# Patient Record
Sex: MALE | Race: WHITE | Employment: OTHER | URBAN - METROPOLITAN AREA
[De-identification: names, ages, dates, MRNs, and addresses within clinical notes are randomized per-mention and may not be internally consistent; named-entity substitution may affect disease eponyms.]

---

## 2017-04-04 RX ORDER — TAMSULOSIN HYDROCHLORIDE 0.4 MG/1
CAPSULE ORAL
Qty: 90 CAP | Refills: 1 | Status: SHIPPED | OUTPATIENT
Start: 2017-04-04 | End: 2017-10-25 | Stop reason: SDUPTHER

## 2017-04-27 RX ORDER — AMLODIPINE BESYLATE 10 MG/1
TABLET ORAL
Qty: 30 TAB | Refills: 6 | Status: SHIPPED | OUTPATIENT
Start: 2017-04-27 | End: 2017-09-25 | Stop reason: SDUPTHER

## 2017-08-28 ENCOUNTER — HOSPITAL ENCOUNTER (OUTPATIENT)
Dept: NON INVASIVE DIAGNOSTICS | Age: 72
Discharge: HOME | End: 2017-08-28
Attending: INTERNAL MEDICINE
Payer: MEDICARE

## 2017-08-28 DIAGNOSIS — I35.9 AORTIC VALVE DISORDER: ICD-10-CM

## 2017-08-28 DIAGNOSIS — I10 ESSENTIAL HYPERTENSION: ICD-10-CM

## 2017-08-28 PROCEDURE — 93306 TTE W/DOPPLER COMPLETE: CPT

## 2017-08-29 NOTE — PROGRESS NOTES
Per your last note last year \"Aortic valve disorder. The patient has a sclerotic aortic valve with minimal stenosis and mild aortic insufficiency last assessed by echocardiogram in 2014. I would like to reassess this with an echocardiogram in 1 year prior to his next visit. No new symptoms concerning for high-grade disease.

## 2017-09-15 ENCOUNTER — TELEPHONE (OUTPATIENT)
Dept: CARDIOLOGY CLINIC | Age: 72
End: 2017-09-15

## 2017-09-15 NOTE — TELEPHONE ENCOUNTER
----- Message from Huma Pascual MD sent at 9/8/2017  8:25 AM EDT -----  Please let the patient know that his echocardiogram was unchanged compared to the one from 2014.  ----- Message -----     From: Jan Suazo RN     Sent: 8/29/2017   7:30 AM       To: Huma Pascual MD    Per your last note last year \"Aortic valve disorder. The patient has a sclerotic aortic valve with minimal stenosis and mild aortic insufficiency last assessed by echocardiogram in 2014. I would like to reassess this with an echocardiogram in 1 year prior to his next visit. No new symptoms concerning for high-grade disease.

## 2017-09-25 RX ORDER — AMLODIPINE BESYLATE 10 MG/1
TABLET ORAL
Qty: 90 TAB | Refills: 3 | Status: SHIPPED | OUTPATIENT
Start: 2017-09-25

## 2017-10-25 ENCOUNTER — OFFICE VISIT (OUTPATIENT)
Dept: CARDIOLOGY CLINIC | Age: 72
End: 2017-10-25

## 2017-10-25 VITALS
WEIGHT: 165 LBS | OXYGEN SATURATION: 98 % | DIASTOLIC BLOOD PRESSURE: 92 MMHG | HEIGHT: 68 IN | HEART RATE: 54 BPM | BODY MASS INDEX: 25.01 KG/M2 | SYSTOLIC BLOOD PRESSURE: 180 MMHG

## 2017-10-25 DIAGNOSIS — E11.9 CONTROLLED TYPE 2 DIABETES MELLITUS WITHOUT COMPLICATION, WITHOUT LONG-TERM CURRENT USE OF INSULIN (HCC): ICD-10-CM

## 2017-10-25 DIAGNOSIS — R01.1 HEART MURMUR: ICD-10-CM

## 2017-10-25 DIAGNOSIS — Z72.0 TOBACCO ABUSE: ICD-10-CM

## 2017-10-25 DIAGNOSIS — I10 ESSENTIAL HYPERTENSION: Primary | ICD-10-CM

## 2017-10-25 DIAGNOSIS — E78.5 DYSLIPIDEMIA: ICD-10-CM

## 2017-10-25 RX ORDER — ROSUVASTATIN CALCIUM 20 MG/1
20 TABLET, COATED ORAL
COMMUNITY

## 2017-10-25 RX ORDER — BENAZEPRIL HYDROCHLORIDE 20 MG/1
20 TABLET ORAL DAILY
Qty: 30 TAB | Refills: 5 | Status: SHIPPED | OUTPATIENT
Start: 2017-10-25 | End: 2017-11-15 | Stop reason: SDUPTHER

## 2017-10-25 NOTE — PATIENT INSTRUCTIONS
Follow up in 6 months   Metabolic Panel Lab (bloodwork) in 4 weeks   Start benazepril 20 mg once a day(sent to [facility])   nessa call in 3-4 weeks to report his Blood pressure (keep a journal) (Check first thing in the morning and about 2-3 hours after you take your Blood pressure medication)         Home Blood Pressure Test: About This Test  What is it? A home blood pressure test allows you to keep track of your blood pressure at home. Blood pressure is a measure of the force of blood against the walls of your arteries. Blood pressure readings include two numbers, such as 130/80 (say \"130 over 80\"). The first number is the systolic pressure. The second number is the diastolic pressure. Why is this test done? You may do this test at home to:  · Find out if you have high blood pressure. · Track your blood pressure if you have high blood pressure. · Track how well medicine is working to reduce high blood pressure. · Check how lifestyle changes, such as weight loss and exercise, are affecting blood pressure. How can you prepare for the test?  · Do not use caffeine, tobacco, or medicines known to raise blood pressure (such as nasal decongestant sprays) for at least 30 minutes before taking your blood pressure. · Do not exercise for at least 30 minutes before taking your blood pressure. What happens before the test?  Take your blood pressure while you feel comfortable and relaxed. Sit quietly with both feet on the floor for at least 5 minutes before the test.  What happens during the test?  · Sit with your arm slightly bent and resting on a table so that your upper arm is at the same level as your heart. · Roll up your sleeve or take off your shirt to expose your upper arm. · Wrap the blood pressure cuff around your upper arm so that the lower edge of the cuff is about 1 inch above the bend of your elbow.   Proceed with the following steps depending on if you are using an automatic or manual pressure monitor. Automatic blood pressure monitors  · Press the on/off button on the automatic monitor and wait until the ready-to-measure \"heart\" symbol appears next to zero in the display window. · Press the start button. The cuff will inflate and deflate by itself. · Your blood pressure numbers will appear on the screen. · Write your numbers in your log book, along with the date and time. Manual blood pressure monitors  · Place the earpieces of a stethoscope in your ears, and place the bell of the stethoscope over the artery, just below the cuff. · Close the valve on the rubber inflating bulb. · Squeeze the bulb rapidly with your opposite hand to inflate the cuff until the dial or column of mercury reads about 30 mm Hg higher than your usual systolic pressure. If you do not know your usual pressure, inflate the cuff to 210 mm Hg or until the pulse at your wrist disappears. · Open the pressure valve just slightly by twisting or pressing the valve on the bulb. · As you watch the pressure slowly fall, note the level on the dial at which you first start to hear a pulsing or tapping sound through the stethoscope. This is your systolic blood pressure. · Continue letting the air out slowly. The sounds will become muffled and will finally disappear. Note the pressure when the sounds completely disappear. This is your diastolic blood pressure. Let out all the remaining air. · Write your numbers in your log book, along with the date and time. What else should you know about the test?  Results for adults ages 25 and older (mm Hg):  · Normal (ideal): Systolic 893 or below. Diastolic 79 or below. · Prehypertension: Systolic 329 to 877. Diastolic 80 to 89. · Hypertension: Systolic 387 or above. Diastolic 90 or above. Follow-up care is a key part of your treatment and safety. Be sure to make and go to all appointments, and call your doctor if you are having problems.  It's also a good idea to keep a list of the medicines you take. Where can you learn more? Go to http://spike-rosa isela.info/. Enter C427 in the search box to learn more about \"Home Blood Pressure Test: About This Test.\"  Current as of: November 3, 2016  Content Version: 11.3  © 1835-3814 [employer]. Care instructions adapted under license by [employer] (which disclaims liability or warranty for this information). If you have questions about a medical condition or this instruction, always ask your healthcare professional. [address] any warranty or liability for your use of this information.

## 2017-10-25 NOTE — PROGRESS NOTES
1. Have you been to the ER, urgent care clinic since your last visit? Hospitalized since your last visit? No     2. Have you seen or consulted any other health care providers outside of the [address] [place] [address] since your last visit? Include any pap smears or colon screening.  No

## 2017-10-25 NOTE — MR AVS SNAPSHOT
Visit Information Date & Time Provider Department Dept. Phone Encounter #  
 10/25/2017  8:20 AM Renay Thayer MD [facility] [phone] Upcoming Health Maintenance Date Due Hepatitis C Screening 1945 HEMOGLOBIN A1C Q6M 1945 FOOT EXAM Q1 5/22/1956 MICROALBUMIN Q1 5/22/1956 EYE EXAM RETINAL OR DILATED Q1 5/22/1956 DTaP/Tdap/Td series (1 - Tdap) 5/22/1967 FOBT Q 1 YEAR AGE 50-75 5/22/1996 ZOSTER VACCINE AGE 60> 3/22/2006 GLAUCOMA SCREENING Q2Y 5/22/2011 Pneumococcal 65+ Low/Medium Risk (1 of 2 - PCV13) 5/22/2011 MEDICARE YEARLY EXAM 5/22/2011 LIPID PANEL Q1 5/27/2015 INFLUENZA AGE 9 TO ADULT 8/1/2017 Allergies as of 10/25/2017  Review Complete On: 9/13/2016 By: Renay Thayer MD  
 No Known Allergies Current Immunizations  Never Reviewed No immunizations on file. Not reviewed this visit You Were Diagnosed With   
  
 Codes Comments Heart murmur    -  Primary ICD-10-CM: R01.1 ICD-9-CM: 733. 2 Dyslipidemia     ICD-10-CM: E78.5 ICD-9-CM: 272.4 Essential hypertension     ICD-10-CM: I10 
ICD-9-CM: 401.9 Vitals BP Pulse Height(growth percentile) Weight(growth percentile) SpO2 BMI  
 (!) 180/92 (!) 54 5' 8\" (1.727 m) 165 lb (74.8 kg) 98% 25.09 kg/m2 Smoking Status Current Every Day Smoker Vitals History BMI and BSA Data Body Mass Index Body Surface Area 25.09 kg/m 2 1.89 m 2 Preferred Pharmacy Pharmacy Name Phone [phone], [address] [phone] Your Updated Medication List  
  
   
This list is accurate as of: 10/25/17  9:23 AM.  Always use your most recent med list. amLODIPine 10 mg tablet Commonly known as:  Haris Jerome TAKE 1 TABLET BY MOUTH DAILY. aspirin 81 mg tablet Take 81 mg by mouth daily. atenolol 50 mg tablet Commonly known as:  TENORMIN Take 50 mg by mouth daily. CRESTOR 20 mg tablet Generic drug:  rosuvastatin Take 20 mg by mouth nightly. PriLOSEC 10 mg capsule Generic drug:  omeprazole Take 5 mg by mouth daily. tamsulosin 0.4 mg capsule Commonly known as:  FLOMAX TAKE ONE CAPSULE BY MOUTH DAILY TRILIPIX 135 mg capsule Generic drug:  fenofibric acid Take 135 mg by mouth nightly. VITAMIN A PO Take  by mouth daily. VITAMIN D3 1,000 unit Cap Generic drug:  cholecalciferol Take  by mouth daily. We Performed the Following AMB POC EKG ROUTINE W/ 12 LEADS, INTER & REP [18870 CPT(R)] To-Do List   
 11/15/2017 Lab:  METABOLIC PANEL, BASIC Patient Instructions Follow up in 6 months Metabolic Panel Lab (bloodwork) in 4 weeks Start benazepril 20 mg once a day(sent to [facility]) pleasce call in 3-4 weeks to report his Blood pressure (keep a journal) Home Blood Pressure Test: About This Test 
What is it? A home blood pressure test allows you to keep track of your blood pressure at home. Blood pressure is a measure of the force of blood against the walls of your arteries. Blood pressure readings include two numbers, such as 130/80 (say \"130 over 80\"). The first number is the systolic pressure. The second number is the diastolic pressure. Why is this test done? You may do this test at home to: · Find out if you have high blood pressure. · Track your blood pressure if you have high blood pressure. · Track how well medicine is working to reduce high blood pressure. · Check how lifestyle changes, such as weight loss and exercise, are affecting blood pressure. How can you prepare for the test? 
· Do not use caffeine, tobacco, or medicines known to raise blood pressure (such as nasal decongestant sprays) for at least 30 minutes before taking your blood pressure. · Do not exercise for at least 30 minutes before taking your blood pressure. What happens before the test? 
Take your blood pressure while you feel comfortable and relaxed. Sit quietly with both feet on the floor for at least 5 minutes before the test. 
What happens during the test? 
· Sit with your arm slightly bent and resting on a table so that your upper arm is at the same level as your heart. · Roll up your sleeve or take off your shirt to expose your upper arm. · Wrap the blood pressure cuff around your upper arm so that the lower edge of the cuff is about 1 inch above the bend of your elbow. Proceed with the following steps depending on if you are using an automatic or manual pressure monitor. Automatic blood pressure monitors · Press the on/off button on the automatic monitor and wait until the ready-to-measure \"heart\" symbol appears next to zero in the display window. · Press the start button. The cuff will inflate and deflate by itself. · Your blood pressure numbers will appear on the screen. · Write your numbers in your log book, along with the date and time. Manual blood pressure monitors · Place the earpieces of a stethoscope in your ears, and place the bell of the stethoscope over the artery, just below the cuff. · Close the valve on the rubber inflating bulb. · Squeeze the bulb rapidly with your opposite hand to inflate the cuff until the dial or column of mercury reads about 30 mm Hg higher than your usual systolic pressure. If you do not know your usual pressure, inflate the cuff to 210 mm Hg or until the pulse at your wrist disappears. · Open the pressure valve just slightly by twisting or pressing the valve on the bulb. · As you watch the pressure slowly fall, note the level on the dial at which you first start to hear a pulsing or tapping sound through the stethoscope. This is your systolic blood pressure. · Continue letting the air out slowly.  The sounds will become muffled and will finally disappear. Note the pressure when the sounds completely disappear. This is your diastolic blood pressure. Let out all the remaining air. · Write your numbers in your log book, along with the date and time. What else should you know about the test? 
Results for adults ages 25 and older (mm Hg): · Normal (ideal): Systolic 162 or below. Diastolic 79 or below. · Prehypertension: Systolic 142 to 466. Diastolic 80 to 89. · Hypertension: Systolic 399 or above. Diastolic 90 or above. Follow-up care is a key part of your treatment and safety. Be sure to make and go to all appointments, and call your doctor if you are having problems. It's also a good idea to keep a list of the medicines you take. Where can you learn more? Go to http://spike-rosa isela.info/. Enter C427 in the search box to learn more about \"Home Blood Pressure Test: About This Test.\" Current as of: November 3, 2016 Content Version: 11.3 © 2903-5389 [email or web address]. Care instructions adapted under license by [employer] (which disclaims liability or warranty for this information). If you have questions about a medical condition or this instruction, always ask your healthcare professional. [address] any warranty or liability for your use of this information. Introducing [facility] HE[facility]! Dear Errol Payment: Thank you for requesting a "[employer]" account. Our records indicate that you already have an active "[employer]" account. You can access your account anytime at [email or web address] Did you know that you can access your hospital and ER discharge instructions at any time in "[employer]"? You can also review all of your test results from your hospital stay or ER visit. Additional Information If you have questions, please visit the Frequently Asked Questions section of the "[employer]" website at [email or web address] Remember, [employer] is NOT to be used for urgent needs. For medical emergencies, dial 911. Now available from your iPhone and Android! Please provide this summary of care documentation to your next provider. Your primary care clinician is listed as Neal Hannon. If you have any questions after today's visit, please call [phone].

## 2017-10-25 NOTE — PROGRESS NOTES
HISTORY OF PRESENT ILLNESS  Mitzi Bonilla is a 71 y.o. male. Hypertension   Pertinent negatives include no chest pain, no abdominal pain, no headaches and no shortness of breath. Dizziness   Pertinent negatives include no chest pain, no abdominal pain, no headaches and no shortness of breath. Patient presents for a followup office visit. He was originally referred here for an abnormal echocardiogram as part of a preoperative workup. The patient does have a history of hypertension and dyslipidemia, along with long-standing tobacco history. He does not have any known cardiac disease. Patient underwent a pharmacological nuclear stress test April 2009, which was a normal study. He also underwent an echocardiogram in September 2010, which showed normal left ventricular ejection fraction 60-65%, mild LVH, aortic valve sclerosis with only minimal stenosis and mild aortic insufficiency. More recently, he underwent a follow-up echocardiogram in August 2017 which showed normal left ventricular ejection fraction 60-65%, with aortic valve sclerosis, no stenosis, and mild aortic insufficiency. Patient was last seen in the office 1 year ago. Since last visit, he stopped taking his Benicar 2 months ago after reading about some side effects. He states he was having some abdominal discomfort, which resolved after stopping the medication, however, he has noted that his blood pressure has been more elevated since being off the medication. He does complain of occasional dizzy spells but no azam syncope. No palpitations, no chest pain, shortness of breath, orthopnea or PND. No leg swelling or claudication. Past Medical History:   Diagnosis Date    Arthritis     hip and back    Cardiac echocardiogram 06/24/2014    EF 65%. No WMA. Normal fdiastolic fx. Mild AI. No significant chg from study of 9/10/10.  Cardiac nuclear imaging study, normal 04/03/2009    No ischemia or prior infarction. EF 68%.   No reg'l WMA.  [facility]2012    type II    Dyslipidemia     Essential hypertension     Gallstones     GERD (gastroesophageal reflux disease)     Heart murmur     HTN (hypertension)     Irregular heart beat     Kidney disease     Osteopenia       Current Outpatient Prescriptions   Medication Sig Dispense Refill    rosuvastatin (CRESTOR) 20 mg tablet Take 20 mg by mouth nightly.  amLODIPine (NORVASC) 10 mg tablet TAKE 1 TABLET BY MOUTH DAILY. 90 Tab 3    tamsulosin (FLOMAX) 0.4 mg capsule TAKE ONE CAPSULE BY MOUTH DAILY 90 Cap 2    omeprazole (PRILOSEC) 10 mg capsule Take 5 mg by mouth daily.  Cholecalciferol, Vitamin D3, (VITAMIN D3) 1,000 unit cap Take  by mouth daily.  VITAMIN A PO Take  by mouth daily.  fenofibric acid (TRILIPIX) 135 mg capsule Take 135 mg by mouth nightly.  atenolol (TENORMIN) 50 mg tablet Take 50 mg by mouth daily.  aspirin 81 mg tablet Take 81 mg by mouth daily. No Known Allergies       Social History   Substance Use Topics    Smoking status: Current Every Day Smoker     Packs/day: 0.25     Years: 50.00     Last attempt to quit: 10/1/2012    Smokeless tobacco: Never Used    Alcohol use No      Comment: occasionally          Review of Systems   Constitutional: Negative for chills, fever and weight loss. HENT: Negative for nosebleeds. Eyes: Negative for blurred vision and double vision. Respiratory: Negative for cough, shortness of breath and wheezing. Cardiovascular: Negative for chest pain, palpitations, orthopnea, claudication, leg swelling and PND. Gastrointestinal: Negative for abdominal pain, heartburn, nausea and vomiting. Genitourinary: Negative for dysuria and hematuria. Musculoskeletal: Negative for falls and myalgias. Skin: Negative for rash. Neurological: Positive for dizziness. Negative for focal weakness and headaches. Endo/Heme/Allergies: Does not bruise/bleed easily.    Psychiatric/Behavioral: Negative for substance abuse. Visit Vitals    BP (!) 180/92    Pulse (!) 54    Ht 5' 8\" (1.727 m)    Wt 74.8 kg (165 lb)    SpO2 98%    BMI 25.09 kg/m2      Physical Exam   Constitutional: He is oriented to person, place, and time. He appears well-developed and well-nourished. HENT:   Head: Normocephalic and atraumatic. Eyes: Conjunctivae are normal.   Neck: Neck supple. No JVD present. Carotid bruit is not present. Cardiovascular: Regular rhythm, S1 normal, S2 normal and normal pulses. Bradycardia present. Exam reveals no gallop and no S3.    Murmur heard. Midsystolic murmur is present with a grade of 2/6  at the upper right sternal border radiating to the neck  Pulmonary/Chest: Breath sounds normal. He has no wheezes. He has no rales. Abdominal: Soft. Bowel sounds are normal. There is no tenderness. Musculoskeletal: He exhibits no edema. Neurological: He is alert and oriented to person, place, and time. Skin: Skin is warm and dry. EKG: Sinus bradycardia, occasional PAC, borderline poor R. Progression,  Normal axis, normal QTc interval.  Nonspecific ST abnormality. Compared to previous EKG, the nonspecific ST abnormality is slightly worse. ASSESSMENT and PLAN    Essential hypertension. Patient's blood pressure significantly elevated today in the office. This is likely due to him stopping the Benicar 2 months ago. I recommended starting a different class of medications, I would like to try him on an ACE inhibitor, benazepril 20 mg daily and checking a Chem-7 panel in 1 month. Aortic valve disorder. This is only mild in severity and was recently rechecked on an echocardiogram in August 2017. He has aortic valve sclerosis with mild aortic insufficiency but no significant stenosis. This is not significantly changed over the past 6-7 years, so I do not think a follow-up echocardiogram will be needed unless new symptoms arise. Dyslipidemia: Mixed.   Patient has been managed with both Crestor and fenofibric acid, both of which I would continue. This followed closely by his PCP. Diabetes mellitus. e was initially started on oral agents, but is now managing this with lifestyle modification. His goal A1c should be less than 7. History of Tobacco abuse. Patient has a 25-30-pack-year history, he quit smoking briefly several years ago and has been smoking half a pack of cigarettes a day again for the past 3 years. He was strongly encouraged to consider smoking cessation.     Followup in 6 months, sooner if needed

## 2017-11-15 RX ORDER — BENAZEPRIL HYDROCHLORIDE 20 MG/1
20 TABLET ORAL DAILY
Qty: 90 TAB | Refills: 3 | Status: SHIPPED | OUTPATIENT
Start: 2017-11-15 | End: 2017-12-04 | Stop reason: ALTCHOICE

## 2017-12-01 LAB
AMBIG ABBREV BMP8 DEFAULT, 977205: NORMAL
BUN SERPL-MCNC: 29 MG/DL (ref 8–27)
BUN/CREAT SERPL: 18 (ref 10–24)
CALCIUM SERPL-MCNC: 9.8 MG/DL (ref 8.6–10.2)
CHLORIDE SERPL-SCNC: 105 MMOL/L (ref 96–106)
CO2 SERPL-SCNC: 24 MMOL/L (ref 18–29)
CREAT SERPL-MCNC: 1.65 MG/DL (ref 0.76–1.27)
GFR SERPLBLD CREATININE-BSD FMLA CKD-EPI: 41 ML/MIN/1.73
GFR SERPLBLD CREATININE-BSD FMLA CKD-EPI: 48 ML/MIN/1.73
GLUCOSE SERPL-MCNC: 101 MG/DL (ref 65–99)
POTASSIUM SERPL-SCNC: 5.5 MMOL/L (ref 3.5–5.2)
SODIUM SERPL-SCNC: 142 MMOL/L (ref 134–144)

## 2017-12-01 NOTE — PROGRESS NOTES
Essential hypertension. Patient's blood pressure significantly elevated today in the office. This is likely due to him stopping the Benicar 2 months ago.   I recommended starting a different class of medications, I would like to try him on an ACE inhibitor, benazepril 20 mg daily and checking a Chem-7 panel in 1 month.

## 2017-12-04 ENCOUNTER — TELEPHONE (OUTPATIENT)
Dept: CARDIOLOGY CLINIC | Age: 72
End: 2017-12-04

## 2017-12-04 DIAGNOSIS — N28.9 WORSENING RENAL FUNCTION: Primary | ICD-10-CM

## 2017-12-04 RX ORDER — HYDRALAZINE HYDROCHLORIDE 50 MG/1
50 TABLET, FILM COATED ORAL 2 TIMES DAILY
Qty: 180 TAB | Refills: 3 | Status: SHIPPED | OUTPATIENT
Start: 2017-12-04 | End: 2018-11-19 | Stop reason: SDUPTHER

## 2017-12-04 NOTE — TELEPHONE ENCOUNTER
Called patient. Verified  and full name. Informed about results per Dr Alex Wang. Patient scheduled for 10 January. Refill started for hydralazine. Patient will stop benazepril today. Patient verbalized understanding and agreed with the plan of care. He will be waiting for us to call him to schedule US.

## 2017-12-04 NOTE — TELEPHONE ENCOUNTER
----- Message from Kristian Gracia MD sent at 12/4/2017  1:05 PM EST -----  Regarding: Abnormal labs  Patient's renal function worsened on the ACE inhibitor and his potassium is now elevated. This is likely due to the medication. He will need to stop his benazepril, and I would like to order a renal arterial duplex scan to evaluate for any renal artery stenosis. Finally I would like to start hydralazine 50 mg twice daily. I would then like to see him back in the office in 1 month.  ----- Message -----     From: Zuleima Gomez     Sent: 12/1/2017  12:37 PM       To: Kristian Gracia MD    Essential hypertension. Patient's blood pressure significantly elevated today in the office. This is likely due to him stopping the Benicar 2 months ago.   I recommended starting a different class of medications, I would like to try him on an ACE inhibitor, benazepril 20 mg daily and checking a Chem-7 panel in 1 month.

## 2017-12-18 ENCOUNTER — HOSPITAL ENCOUNTER (OUTPATIENT)
Dept: VASCULAR SURGERY | Age: 72
Discharge: HOME | End: 2017-12-18
Attending: INTERNAL MEDICINE
Payer: MEDICARE

## 2017-12-18 DIAGNOSIS — N28.9 WORSENING RENAL FUNCTION: ICD-10-CM

## 2017-12-18 PROCEDURE — 93975 VASCULAR STUDY: CPT

## 2017-12-18 NOTE — PROCEDURES
[facility]  *** FINAL REPORT ***    Name: Rodrigo Dunn  MRN: [id number]    Outpatient  : 22 May 1945  HIS Order #: [id number]  [id number] [facility] Visit #: [id number]  Date: 18 Dec 2017    TYPE OF TEST: Visceral Arterial Duplex    REASON FOR TEST    Aortic PSV: 272.0 cm/s  Diameter AP: 1.6 cm   TV: 1.6 cm                   Right          Left  Renal Artery:- -------------  -------------  Proximal  PSV: 637.0          341.0  Mid       PSV: 158.0          159.0  Distal    PSV: 148.0          135.0  Aortic ratio :   2.3            1.3    Medullary PSV:  35.0           28.0            EDV:  12.0           10.0            EDR:   0.3            0.4            SDR:   2.9            2.8    Cortical  PSV:  24.0           21.0            EDV:  11.0            8.0            EDR:   0.5            0.4            SDR:   2.2            2.6  Stenosis:      Severe > 60%   Severe > 60%  Kidney size:   12.4 cm        12.9 cm               x  6.0 cm      x  5.8 cm    Hilar:-        Right          Left  Acc. Time  AT:   5 secs        10 secs  Acc. Index AI:             RI: 0.85           0.85    INTERPRETATION/FINDINGS  Duplex images were obtained using 2-D gray scale, color flow, and  spectral Doppler analysis. 1. Note: High aortic PSV invalidates the use of RAR as an indicator of   renal stenosis. RENAL:  1. Bilateral Severe > 60% renal artery stenosis. 2. The right kidney measures 12.4 cm. 3. The left kidney measures 12.9 cm.  4. The renal vein is patent bilaterally. 5.  There is no evidence of aneurysm  noted in the abdominal aorta,  but there is moderate calcified plaque that causes stenosis. The peak  systolic velocity in the aorta is 267 cm/s. ADDITIONAL COMMENTS    I have personally reviewed the data relevant to the interpretation of  this  study.     TECHNOLOGIST: Roshan Puentes, [facility], RVT/  Signed: 2017 10:16 AM    PHYSICIAN: Dev Guadalupe MD  Signed: 2017 08:50 AM

## 2017-12-20 NOTE — PROGRESS NOTES
----- Message from Janella Pallas, MD sent at 12/4/2017  1:05 PM EST -----  Regarding: Abnormal labs  Patient's renal function worsened on the ACE inhibitor and his potassium is now elevated. This is likely due to the medication. He will need to stop his benazepril, and I would like to order a renal arterial duplex scan to evaluate for any renal artery stenosis. Finally I would like to start hydralazine 50 mg twice daily. I would then like to see him back in the office in 1 month.  ----- Message -----     From: Stephan Lele     Sent: 12/1/2017  12:37 PM       To: Janella Pallas, MD     Essential hypertension. Patient's blood pressure significantly elevated today in the office. This is likely due to him stopping the Benicar 2 months ago.   I recommended starting a different class of medications, I would like to try him on an ACE inhibitor, benazepril 20 mg daily and checking a Chem-7 panel in 1 month.

## 2018-01-10 ENCOUNTER — OFFICE VISIT (OUTPATIENT)
Dept: CARDIOLOGY CLINIC | Age: 73
End: 2018-01-10

## 2018-01-10 VITALS
SYSTOLIC BLOOD PRESSURE: 174 MMHG | HEART RATE: 60 BPM | HEIGHT: 68 IN | OXYGEN SATURATION: 96 % | WEIGHT: 170 LBS | DIASTOLIC BLOOD PRESSURE: 60 MMHG | BODY MASS INDEX: 25.76 KG/M2

## 2018-01-10 DIAGNOSIS — E11.9 CONTROLLED TYPE 2 DIABETES MELLITUS WITHOUT COMPLICATION, WITHOUT LONG-TERM CURRENT USE OF INSULIN (HCC): ICD-10-CM

## 2018-01-10 DIAGNOSIS — I70.1 RAS (RENAL ARTERY STENOSIS) (HCC): Primary | ICD-10-CM

## 2018-01-10 DIAGNOSIS — I10 ESSENTIAL HYPERTENSION: ICD-10-CM

## 2018-01-10 DIAGNOSIS — I35.9 AORTIC VALVE DISORDER: ICD-10-CM

## 2018-01-10 DIAGNOSIS — Z72.0 TOBACCO ABUSE: ICD-10-CM

## 2018-01-10 DIAGNOSIS — E78.5 DYSLIPIDEMIA: ICD-10-CM

## 2018-01-10 PROBLEM — E11.21 TYPE 2 DIABETES MELLITUS WITH NEPHROPATHY (HCC): Status: ACTIVE | Noted: 2018-01-10

## 2018-01-10 NOTE — PROGRESS NOTES
1. Have you been to the ER, urgent care clinic since your last visit? Hospitalized since your last visit? No     2. Have you seen or consulted any other health care providers outside of the [address] [place] [address] since your last visit? Include any pap smears or colon screening.  No

## 2018-01-10 NOTE — PROGRESS NOTES
HISTORY OF PRESENT ILLNESS  Geovanna Pinto is a 71 y.o. male. Hypertension   Associated symptoms include shortness of breath. Pertinent negatives include no chest pain, no abdominal pain and no headaches. Dizziness   Associated symptoms include shortness of breath. Pertinent negatives include no chest pain, no abdominal pain and no headaches. Patient presents for a followup office visit. He was originally referred here for an abnormal echocardiogram as part of a preoperative workup. The patient does have a history of hypertension and dyslipidemia, along with long-standing tobacco history. He does not have any known cardiac disease. Patient underwent a pharmacological nuclear stress test April 2009, which was a normal study. He also underwent an echocardiogram in September 2010, which showed normal left ventricular ejection fraction 60-65%, mild LVH, aortic valve sclerosis with only minimal stenosis and mild aortic insufficiency. More recently, he underwent a follow-up echocardiogram in August 2017 which showed normal left ventricular ejection fraction 60-65%, with aortic valve sclerosis, no stenosis, and mild aortic insufficiency. Patient developed significant hyperkalemia and renal dysfunction after starting ARB, so underwent a renal vascular duplex scan which showed significant bilateral renal artery stenosis in the severe range along with significant atherosclerosis of his abdominal aorta. He was last seen in the office 2-3 months ago. Since that time, he has been having intermittent dizzy spells, and also admits to decreased activity tolerance with dyspnea on exertion. His blood pressure remains elevated and has been difficult to control on 3 different antihypertensive agents. He denies any dyspnea at rest.  No orthopnea, PND or leg swelling. No claudication. No chest pain or pressure.     Past Medical History:   Diagnosis Date    Arthritis     hip and back    Cardiac echocardiogram 06/24/2014    EF 65%. No WMA. Normal fdiastolic fx. Mild AI. No significant chg from study of 9/10/10.  Cardiac nuclear imaging study, normal 04/03/2009    No ischemia or prior infarction. EF 68%. No reg'l WMA.  Diabetes ([facility] [address] 11/2012    type II    Dyslipidemia     Essential hypertension     Gallstones     GERD (gastroesophageal reflux disease)     Heart murmur     HTN (hypertension)     Irregular heart beat     Kidney disease     Osteopenia       Current Outpatient Prescriptions   Medication Sig Dispense Refill    hydrALAZINE (APRESOLINE) 50 mg tablet Take 1 Tab by mouth two (2) times a day. 180 Tab 3    rosuvastatin (CRESTOR) 20 mg tablet Take 20 mg by mouth nightly.  amLODIPine (NORVASC) 10 mg tablet TAKE 1 TABLET BY MOUTH DAILY. 90 Tab 3    tamsulosin (FLOMAX) 0.4 mg capsule TAKE ONE CAPSULE BY MOUTH DAILY 90 Cap 2    omeprazole (PRILOSEC) 10 mg capsule Take 5 mg by mouth daily.  Cholecalciferol, Vitamin D3, (VITAMIN D3) 1,000 unit cap Take  by mouth daily.  VITAMIN A PO Take  by mouth daily.  fenofibric acid (TRILIPIX) 135 mg capsule Take 135 mg by mouth nightly.  atenolol (TENORMIN) 50 mg tablet Take 50 mg by mouth daily.  aspirin 81 mg tablet Take 81 mg by mouth daily. No Known Allergies       Social History   Substance Use Topics    Smoking status: Current Every Day Smoker     Packs/day: 0.25     Years: 50.00     Last attempt to quit: 10/1/2012    Smokeless tobacco: Never Used    Alcohol use No      Comment: occasionally        Family History   Problem Relation Age of Onset    Heart Disease Mother      hx of valve surgery    Cancer Father          Review of Systems   Constitutional: Positive for malaise/fatigue. Negative for chills, fever and weight loss. HENT: Negative for nosebleeds. Eyes: Negative for blurred vision and double vision. Respiratory: Positive for shortness of breath. Negative for cough and wheezing. Cardiovascular: Negative for chest pain, palpitations, orthopnea, claudication, leg swelling and PND. Gastrointestinal: Negative for abdominal pain, heartburn, nausea and vomiting. Genitourinary: Negative for dysuria and hematuria. Musculoskeletal: Negative for falls and myalgias. Skin: Negative for rash. Neurological: Positive for dizziness. Negative for focal weakness and headaches. Endo/Heme/Allergies: Does not bruise/bleed easily. Psychiatric/Behavioral: Negative for substance abuse. Visit Vitals    /60    Pulse 60    Ht 5' 8\" (1.727 m)    Wt 77.1 kg (170 lb)    SpO2 96%    BMI 25.85 kg/m2      Physical Exam   Constitutional: He is oriented to person, place, and time. He appears well-developed and well-nourished. HENT:   Head: Normocephalic and atraumatic. Eyes: Conjunctivae are normal.   Neck: Neck supple. No JVD present. Carotid bruit is present ( Soft on the right). Cardiovascular: Regular rhythm, S1 normal, S2 normal and normal pulses. Bradycardia present. Exam reveals no gallop and no S3.    Murmur heard. Midsystolic murmur is present with a grade of 2/6  at the upper right sternal border radiating to the neck  Pulmonary/Chest: Breath sounds normal. He has no wheezes. He has no rales. Abdominal: Soft. Bowel sounds are normal. There is no tenderness. Musculoskeletal: He exhibits no edema, tenderness or deformity. Neurological: He is alert and oriented to person, place, and time. Skin: Skin is warm and dry. Psychiatric: He has a normal mood and affect. His behavior is normal. Thought content normal.     EKG: Normal sinus rhythm, borderline poor R. Progression,  Normal axis, normal QTc interval.  Nonspecific ST abnormality. Compared to previous EKG,  no significant interval change. ASSESSMENT and PLAN    Severe bilateral renal artery stenosis. Patient is now on 3 antihypertensive agents. He has moderate renal dysfunction.   His blood pressure remains poorly controlled on this regimen, so I have recommended pursuing percutaneous stenting of 1 of his renal arteries. Patient would like to call back and schedule this with our interventional cardiologist.  I have also recommended a diagnostic cardiac catheterization at that time to evaluate for any significant coronary disease due to his decreased activity tolerance over the past year. Decreased activity tolerance. This will be assessed with the heart catheterization at the time of his  Renal stent as described above. Essential hypertension. Patient's blood pressure continues to be difficult to control on 3 antihypertensive agents. I suspect this will be better controlled once his renal artery stenting is performed. Aortic valve disorder. This is only mild in severity and was recently rechecked on an echocardiogram in August 2017. He has aortic valve sclerosis with mild aortic insufficiency but no significant stenosis. This is not significantly changed over the past 6-7 years, so I do not think a follow-up echocardiogram will be needed unless new symptoms arise. Dyslipidemia: Mixed. Patient has been managed with both Crestor and fenofibric acid, both of which I would continue. This followed closely by his PCP. Diabetes mellitus. The patient was initially started on oral agents, but is now managing this with lifestyle modification. His goal A1c should be less than 7. Tobacco use disorder. Patient has a 25-30-pack-year history, he quit smoking briefly several years ago and has been smoking half a pack of cigarettes a day again for the past 3 years. He was strongly encouraged to consider smoking cessation.     Followup in 3 months, sooner if needed

## 2018-01-12 ENCOUNTER — OFFICE VISIT (OUTPATIENT)
Dept: UROLOGY | Age: 73
End: 2018-01-12

## 2018-01-12 VITALS
SYSTOLIC BLOOD PRESSURE: 140 MMHG | OXYGEN SATURATION: 98 % | DIASTOLIC BLOOD PRESSURE: 53 MMHG | HEIGHT: 68 IN | TEMPERATURE: 97.9 F | WEIGHT: 165 LBS | BODY MASS INDEX: 25.01 KG/M2 | HEART RATE: 59 BPM

## 2018-01-12 DIAGNOSIS — R35.1 NOCTURIA: Primary | ICD-10-CM

## 2018-01-12 DIAGNOSIS — N13.8 BENIGN PROSTATIC HYPERPLASIA WITH URINARY OBSTRUCTION: ICD-10-CM

## 2018-01-12 DIAGNOSIS — R35.0 FREQUENT URINATION: ICD-10-CM

## 2018-01-12 DIAGNOSIS — N45.1 CHRONIC EPIDIDYMITIS: ICD-10-CM

## 2018-01-12 DIAGNOSIS — N40.1 BENIGN PROSTATIC HYPERPLASIA WITH URINARY OBSTRUCTION: ICD-10-CM

## 2018-01-12 LAB
BILIRUB UR QL STRIP: NEGATIVE
GLUCOSE UR-MCNC: NEGATIVE MG/DL
KETONES P FAST UR STRIP-MCNC: NEGATIVE MG/DL
PH UR STRIP: 5.5 [PH] (ref 4.6–8)
PROT UR QL STRIP: NORMAL
SP GR UR STRIP: 1.02 (ref 1–1.03)
UA UROBILINOGEN AMB POC: NORMAL (ref 0.2–1)
URINALYSIS CLARITY POC: CLEAR
URINALYSIS COLOR POC: YELLOW
URINE BLOOD POC: NEGATIVE
URINE LEUKOCYTES POC: NEGATIVE
URINE NITRITES POC: NEGATIVE

## 2018-01-12 RX ORDER — TAMSULOSIN HYDROCHLORIDE 0.4 MG/1
0.4 CAPSULE ORAL DAILY
Qty: 90 CAP | Refills: 3 | Status: SHIPPED | OUTPATIENT
Start: 2018-01-12 | End: 2018-12-14 | Stop reason: SDUPTHER

## 2018-01-12 RX ORDER — BISMUTH SUBSALICYLATE 262 MG
1 TABLET,CHEWABLE ORAL DAILY
COMMUNITY

## 2018-01-12 NOTE — MR AVS SNAPSHOT
Visit Information Date & Time Provider Department Dept. Phone Encounter #  
 1/12/2018  9:15 AM Theresa Callahan Elberon Leela  [facility] [phone] [id number] Your Appointments 7/13/2018  9:00 AM  
Follow Up with Ger Gan MD  
[facility] ([address]) Appt Note: 6 month f/up Anirudh Nettles [id number] [phone] [address] [place] [address] [address] Upcoming Health Maintenance Date Due Hepatitis C Screening 1945 HEMOGLOBIN A1C Q6M 1945 FOOT EXAM Q1 5/22/1956 MICROALBUMIN Q1 5/22/1956 EYE EXAM RETINAL OR DILATED Q1 5/22/1956 DTaP/Tdap/Td series (1 - Tdap) 5/22/1967 FOBT Q 1 YEAR AGE 50-75 5/22/1996 ZOSTER VACCINE AGE 60> 3/22/2006 GLAUCOMA SCREENING Q2Y 5/22/2011 Pneumococcal 65+ Low/Medium Risk (1 of 2 - PCV13) 5/22/2011 MEDICARE YEARLY EXAM 5/22/2011 LIPID PANEL Q1 5/27/2015 Influenza Age 5 to Adult 8/1/2017 Allergies as of 1/12/2018  Review Complete On: 1/12/2018 By: Mansi Quijano LPN No Known Allergies Current Immunizations  Never Reviewed No immunizations on file. Not reviewed this visit You Were Diagnosed With   
  
 Codes Comments Nocturia    -  Primary ICD-10-CM: R35.1 ICD-9-CM: 788.43 Frequent urination     ICD-10-CM: R35.0 ICD-9-CM: 788.41 Vitals BP Pulse Temp Height(growth percentile) Weight(growth percentile) SpO2  
 140/53 (BP 1 Location: Left arm, BP Patient Position: Sitting) (!) 59 97.9 °F (36.6 °C) 5' 8\" (1.727 m) 165 lb (74.8 kg) 98% BMI Smoking Status 25.09 kg/m2 Current Every Day Smoker Vitals History BMI and BSA Data Body Mass Index Body Surface Area 25.09 kg/m 2 1.89 m 2 Preferred Pharmacy Pharmacy Name Phone  [facility] [phone] [facility] [address] [phone] Your Updated Medication List  
  
   
This list is accurate as of: 1/12/18 10:16 AM.  Always use your most recent med list. amLODIPine 10 mg tablet Commonly known as:  Roxana Adrian TAKE 1 TABLET BY MOUTH DAILY. aspirin 81 mg tablet Take 81 mg by mouth daily. atenolol 50 mg tablet Commonly known as:  TENORMIN Take 50 mg by mouth daily. CRESTOR 20 mg tablet Generic drug:  rosuvastatin Take 20 mg by mouth nightly. hydrALAZINE 50 mg tablet Commonly known as:  APRESOLINE Take 1 Tab by mouth two (2) times a day. multivitamin tablet Commonly known as:  ONE A DAY Take 1 Tab by mouth daily. PriLOSEC 10 mg capsule Generic drug:  omeprazole Take 5 mg by mouth daily. tamsulosin 0.4 mg capsule Commonly known as:  FLOMAX TAKE ONE CAPSULE BY MOUTH DAILY TRILIPIX 135 mg capsule Generic drug:  fenofibric acid Take 135 mg by mouth nightly. VITAMIN A PO Take 2,100 Units by mouth daily. VITAMIN D3 1,000 unit Cap Generic drug:  cholecalciferol Take 400 Units by mouth daily. We Performed the Following AMB POC URINALYSIS DIP STICK AUTO W/O MICRO [26016 CPT(R)] UT COLLECTION VENOUS BLOOD,VENIPUNCTURE [id number] CPT(R)] UT MARCIAL,POST-VOID RES,US,NON-IMAGING [id number] CPT(R)] PSA, DIAGNOSTIC (PROSTATE SPECIFIC AG) [id number] CPT(R)] Patient Instructions Frequent Urination: Care Instructions Your Care Instructions An urge to urinate frequently but usually passing only small amounts of urine is a common symptom of urinary problems, such as urinary tract infections. The bladder may become inflamed. This can cause the urge to urinate. You may try to urinate more often than usual to try to soothe that urge. Frequent urination also may be caused by sexually transmitted infections (STIs) or kidney stones.  Or it may happen when something irritates the tube that carries urine from the bladder to the outside of the body (urethra). It may also be a sign of diabetes. The cause may be hard to find. You may need tests. Follow-up care is a key part of your treatment and safety. Be sure to make and go to all appointments, and call your doctor if you are having problems. It's also a good idea to know your test results and keep a list of the medicines you take. How can you care for yourself at home? · Drink extra water for the next day or two. This will help make the urine less concentrated. (If you have kidney, heart, or liver disease and have to limit fluids, talk with your doctor before you increase the amount of fluids you drink.) · Avoid drinks that are carbonated or have caffeine. They can irritate the bladder. For women: · Urinate right after you have sex. · After you go to the bathroom, wipe from front to back. · Avoid douches, bubble baths, and feminine hygiene sprays. And avoid other feminine hygiene products that have deodorants. When should you call for help? Call your doctor now or seek immediate medical care if: 
? · You have new symptoms, such as fever, nausea, or vomiting. ? · You have new or worse symptoms of a urinary problem. For example: ¨ You have blood or pus in your urine. ¨ You have chills or body aches. ¨ It hurts to urinate. ¨ You have groin or belly pain. ¨ You have pain in your back just below your rib cage (the flank area). ? Watch closely for changes in your health, and be sure to contact your doctor if you feel thirstier than usual. 
Where can you learn more? Go to http://spike-rosa isela.info/. Enter 143 [id number] in the search box to learn more about \"Frequent Urination: Care Instructions. \" Current as of: May 12, 2017 Content Version: 11.4 © 1270-4105 [employer].  Care instructions adapted under license by [employer] (which disclaims liability or warranty for this information). If you have questions about a medical condition or this instruction, always ask your healthcare professional. [facility] [address] any warranty or liability for your use of this information. Introducing [facility] SERVICES! Dear Soo Spencer: Thank you for requesting a [employer] account. Our records indicate that you already have an active [employer] account. You can access your account anytime at [email or web address] Did you know that you can access your hospital and ER discharge instructions at any time in Cool[employer]ouds? You can also review all of your test results from your hospital stay or ER visit. Additional Information If you have questions, please visit the Frequently Asked Questions section of the [employer] website at [email or web address] Remember, CoolClouds is NOT to be used for urgent needs. For medical emergencies, dial 911. Now available from your iPhone and Android! Please provide this summary of care documentation to your next provider. Your primary care clinician is listed as Lynda Setting. If you have any questions after today's visit, please call [phone].

## 2018-01-12 NOTE — PROGRESS NOTES
Mr. Carrie Garcia has a reminder for a \"due or due soon\" health maintenance. I have asked that he contact his primary care provider for follow-up on this health maintenance. RBV Per Dr. Marissa Gallagher Flomax 0.4 mg one daily #90 with 3 refills sent to pharmacy.

## 2018-01-12 NOTE — PATIENT INSTRUCTIONS
Frequent Urination: Care Instructions  Your Care Instructions  An urge to urinate frequently but usually passing only small amounts of urine is a common symptom of urinary problems, such as urinary tract infections. The bladder may become inflamed. This can cause the urge to urinate. You may try to urinate more often than usual to try to soothe that urge. Frequent urination also may be caused by sexually transmitted infections (STIs) or kidney stones. Or it may happen when something irritates the tube that carries urine from the bladder to the outside of the body (urethra). It may also be a sign of diabetes. The cause may be hard to find. You may need tests. Follow-up care is a key part of your treatment and safety. Be sure to make and go to all appointments, and call your doctor if you are having problems. It's also a good idea to know your test results and keep a list of the medicines you take. How can you care for yourself at home? · Drink extra water for the next day or two. This will help make the urine less concentrated. (If you have kidney, heart, or liver disease and have to limit fluids, talk with your doctor before you increase the amount of fluids you drink.)  · Avoid drinks that are carbonated or have caffeine. They can irritate the bladder. For women:  · Urinate right after you have sex. · After you go to the bathroom, wipe from front to back. · Avoid douches, bubble baths, and feminine hygiene sprays. And avoid other feminine hygiene products that have deodorants. When should you call for help? Call your doctor now or seek immediate medical care if:  ? · You have new symptoms, such as fever, nausea, or vomiting. ? · You have new or worse symptoms of a urinary problem. For example:  ¨ You have blood or pus in your urine. ¨ You have chills or body aches. ¨ It hurts to urinate. ¨ You have groin or belly pain. ¨ You have pain in your back just below your rib cage (the flank area). ?Watch closely for changes in your health, and be sure to contact your doctor if you feel thirstier than usual.  Where can you learn more? Go to http://spike-rosa isela.info/. Enter 174 [id number] in the search box to learn more about \"Frequent Urination: Care Instructions. \"  Current as of: May 12, 2017  Content Version: 11.4  © 4785-4816 [employer]. Care instructions adapted under license by [employer] (which disclaims liability or warranty for this information). If you have questions about a medical condition or this instruction, always ask your healthcare professional. [place] [address] any warranty or liability for your use of this information.

## 2018-01-12 NOTE — PROGRESS NOTES
Chief Complaint   Patient presents with    Nocturia    Urinary Frequency       HISTORY OF PRESENT ILLNESS:  Griselda Vaca is a 71 y.o. male who presents today in follow-up for the use of Flomax because of urinary retention. In summary, he underwent a cholecystectomy a couple years ago and developed postop urinary retention he was sent here for follow-up and was started on Flomax can actually urinate a great deal better with that. He comes in today primarily because he has run out of his prescription of Flomax and he needs to have it refilled. Currently he has nocturia ×1-2 while he is taking it and no significant straining, postvoid dribbling, or hesitancy. The last PSA I can find on the chart was back about 2015 or so and was not elevated at that time. Currently he is doing very well from a urinary standpoint. He does have a little bit of pain in the left testicle and that has been felt to be some chronic epididymitis in the past.           ROS all documented on the chart, refer to that for details. Past Medical History:   Diagnosis Date    Arthritis     hip and back    Cardiac echocardiogram 06/24/2014    EF 65%. No WMA. Normal fdiastolic fx. Mild AI. No significant chg from study of 9/10/10.  Cardiac nuclear imaging study, normal 04/03/2009    No ischemia or prior infarction. EF 68%. No reg'l WMA.       Diabetes ([facility] [address] 11/2012    type II    Dyslipidemia     Essential hypertension     Gallstones     GERD (gastroesophageal reflux disease)     Heart murmur     HTN (hypertension)     Irregular heart beat     Kidney disease     Osteopenia        Past Surgical History:   Procedure Laterality Date    HX GI      HX TONSILLECTOMY      LAP,CHOLECYSTECTOMY      REMOVAL GALLBLADDER         Social History   Substance Use Topics    Smoking status: Current Every Day Smoker     Packs/day: 0.25     Years: 50.00     Last attempt to quit: 10/1/2012    Smokeless tobacco: Never Used   [employer] Alcohol use No      Comment: occasionally        No Known Allergies    Family History   Problem Relation Age of Onset    Heart Disease Mother      hx of valve surgery    Cancer Father        Current Outpatient Prescriptions   Medication Sig Dispense Refill    multivitamin (ONE A DAY) tablet Take 1 Tab by mouth daily.  tamsulosin (FLOMAX) 0.4 mg capsule Take 1 Cap by mouth daily. 90 Cap 3    hydrALAZINE (APRESOLINE) 50 mg tablet Take 1 Tab by mouth two (2) times a day. 180 Tab 3    rosuvastatin (CRESTOR) 20 mg tablet Take 20 mg by mouth nightly.  amLODIPine (NORVASC) 10 mg tablet TAKE 1 TABLET BY MOUTH DAILY. (Patient taking differently: 5 mg. TAKE 1 TABLET BY MOUTH DAILY. ) 90 Tab 3    tamsulosin (FLOMAX) 0.4 mg capsule TAKE ONE CAPSULE BY MOUTH DAILY 90 Cap 2    omeprazole (PRILOSEC) 10 mg capsule Take 5 mg by mouth daily.  Cholecalciferol, Vitamin D3, (VITAMIN D3) 1,000 unit cap Take 400 Units by mouth daily.  fenofibric acid (TRILIPIX) 135 mg capsule Take 135 mg by mouth nightly.  atenolol (TENORMIN) 50 mg tablet Take 50 mg by mouth daily.  aspirin 81 mg tablet Take 81 mg by mouth daily.  VITAMIN A PO Take 2,100 Units by mouth daily. PHYSICAL EXAMINATION:   Visit Vitals    /53 (BP 1 Location: Left arm, BP Patient Position: Sitting)    Pulse (!) 59  Comment: reported to dr Ramos 97.9 °F (36.6 °C)    Ht 5' 8\" (1.727 m)    Wt 165 lb (74.8 kg)    SpO2 98%    BMI 25.09 kg/m2     Constitutional: WDWN, Pleasant and appropriate affect, No acute distress. CV:  No peripheral swelling noted  Respiratory: No respiratory distress or difficulties  Abdomen:  No abdominal masses or tenderness. No CVA tenderness. No inguinal hernias noted.  Male:    STEVAN:Perineum normal to visual inspection, no erythema or irritation, he has a slightly weakened anal sphincter tone but there are no rectal lesions and no blood on the examining finger.   Prostate is about 30-40 g in size and smooth and symmetrical with no nodularity or induration. Generally felt to be benign in nature. Scrotum: There is some tenderness of the epididymis on the left but there is no significant induration and I think this is simply some chronic congestive epididymitis, nonbacterial in nature. PENIS: Urethral meatus normal in location and size. No urethral discharge. Skin: No jaundice. Neuro/Psych:  Alert and oriented x 3, affect appropriate. Lymphatic:   No enlarged inguinal lymph nodes. Results for orders placed or performed in visit on 01/12/18   AMB POC URINALYSIS DIP STICK AUTO W/O MICRO   Result Value Ref Range    Color (UA POC) Yellow     Clarity (UA POC) Clear     Glucose (UA POC) Negative Negative    Bilirubin (UA POC) Negative Negative    Ketones (UA POC) Negative Negative    Specific gravity (UA POC) 1.020 1.001 - 1.035    Blood (UA POC) Negative Negative    pH (UA POC) 5.5 4.6 - 8.0    Protein (UA POC) 2+ Negative    Urobilinogen (UA POC) 0.2 mg/dL 0.2 - 1    Nitrites (UA POC) Negative Negative    Leukocyte esterase (UA POC) Negative Negative     Bladder scan shows almost 120 cc of post void residual.    REVIEW OF LABS AND IMAGING:     Imaging Report Reviewed? YES     Images Reviewed? YES           Other Lab Data Reviewed? YES         ASSESSMENT:     ICD-10-CM ICD-9-CM    1. Nocturia R35.1 788.43 AMB POC URINALYSIS DIP STICK AUTO W/O MICRO      DE COLLECTION VENOUS BLOOD,VENIPUNCTURE      PSA, DIAGNOSTIC (PROSTATE SPECIFIC AG)      DE MARCIAL,POST-VOID RES,US,NON-IMAGING      tamsulosin (FLOMAX) 0.4 mg capsule      CANCELED: DE COLLECTION VENOUS BLOOD,VENIPUNCTURE      CANCELED: PSA, DIAGNOSTIC (PROSTATE SPECIFIC AG)   2.  Frequent urination R35.0 788.41 AMB POC URINALYSIS DIP STICK AUTO W/O MICRO      DE COLLECTION VENOUS BLOOD,VENIPUNCTURE      PSA, DIAGNOSTIC (PROSTATE SPECIFIC AG)      DE MARCIAL,POST-VOID RES,US,NON-IMAGING      tamsulosin (FLOMAX) 0.4 mg capsule CANCELED: AK COLLECTION VENOUS BLOOD,VENIPUNCTURE      CANCELED: PSA, DIAGNOSTIC (PROSTATE SPECIFIC AG)   3. Benign prostatic hyperplasia with urinary obstruction N40.1 600.01     N13.8 599.69             PLAN / DISCUSSION: : I think he has some significant bladder outlet obstruction from an enlarged prostate that is well controlled with Flomax . I did tell him that usually Flomax is effective for about 6-8 years and he has now been taking it for about 3 years. A PSA is repeated today and if he has no further problems we will see him back on an annual basis. The patient expresses understanding and agreement of the discussion and plan. Wily Anne MD on 1/12/2018         Please note: This document has been produced using voice recognition software. Unrecognized errors in transcription may be present.

## 2018-01-13 LAB — PSA SERPL-MCNC: 2.5 NG/ML (ref 0–4)

## 2018-07-06 ENCOUNTER — OFFICE VISIT (OUTPATIENT)
Dept: PULMONOLOGY | Age: 73
End: 2018-07-06

## 2018-07-06 VITALS
SYSTOLIC BLOOD PRESSURE: 160 MMHG | RESPIRATION RATE: 20 BRPM | DIASTOLIC BLOOD PRESSURE: 60 MMHG | TEMPERATURE: 97.9 F | HEIGHT: 69 IN | BODY MASS INDEX: 23.99 KG/M2 | WEIGHT: 162 LBS | HEART RATE: 52 BPM | OXYGEN SATURATION: 98 %

## 2018-07-06 DIAGNOSIS — I25.10 CORONARY ARTERY DISEASE INVOLVING NATIVE CORONARY ARTERY OF NATIVE HEART WITHOUT ANGINA PECTORIS: ICD-10-CM

## 2018-07-06 DIAGNOSIS — J44.9 CHRONIC OBSTRUCTIVE PULMONARY DISEASE, UNSPECIFIED COPD TYPE (HCC): ICD-10-CM

## 2018-07-06 DIAGNOSIS — J90 PLEURAL EFFUSION ON LEFT: ICD-10-CM

## 2018-07-06 DIAGNOSIS — F17.200 CURRENT SMOKER: ICD-10-CM

## 2018-07-06 DIAGNOSIS — I83.893 VARICOSE VEINS OF BOTH LEGS WITH EDEMA: ICD-10-CM

## 2018-07-06 DIAGNOSIS — R06.09 DOE (DYSPNEA ON EXERTION): Primary | ICD-10-CM

## 2018-07-06 RX ORDER — CARVEDILOL 12.5 MG/1
6.25 TABLET ORAL
COMMUNITY
Start: 2018-03-13

## 2018-07-06 RX ORDER — NITROGLYCERIN 0.4 MG/1
0.4 TABLET SUBLINGUAL
COMMUNITY

## 2018-07-06 NOTE — PROGRESS NOTES
[facility]  Pulmonary, Critical Care, and Sleep Medicine      Pulmonary Office Initial Consultation    Name: Kaila Pickett     : 1945     Date: 2018        Subjective:   Patient has been referred for evaluation of: SOB    Patient is a 68 y.o. male who has been a life long smoker- currently 10 cig. day . He has noticed SOB for several years which was predominately on exertion - walked 2-3 miles daily and felt winded but noticed acute decompensation after he had a stress test ( nuclear ) with cath and RCA stent. He seems to correlate the initiation of COREG to decline in symptoms. He had a second intervention with 2 more stents . Also more recently and renal artery stenting. He now can not walk more than 1 block. Climbs stairs at home slowly. Denies orthopnea or PND. Has noticed significant persistent swelling of lower extremities and extreme fatigue. Denies any acute preceeding viral/flu like illness. Denies fever/chills. Was recently started on [facility] by Dr. Colleen Carrillo and feels it helps. Reports recent findings of KYREE and proteinuria. SOB:   Symptoms occur at exertion. Able to walk about 200 Feet. Can climb stairs. Activities of daily living are somewhat affected  Denies orthopnea/ paroxysmal nocturnal dyspnea  SOB relieved at rest    Cough: very mild- not a complaint  Denies Associated wheezing, chest pain, fever, chills, night sweats dyspepsia, reflux. Comorbid conditions include- DM, HTN, KLAUDIA. 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 No WMA. Normal fdiastolic fx. Mild AI. No significant chg from study of 9/10/10.  Cardiac nuclear imaging study, normal 2009    No ischemia or prior infarction. EF 68%. No reg'l WMA.       Chronic lung disease     Diabetes ([facility] [address] 2012    type II    Diabetes mellitus ([facility] [address]     Dyslipidemia     Essential hypertension     Gallstones     GERD (gastroesophageal reflux disease)     Heart murmur     HTN (hypertension)     Irregular heart beat     Kidney disease     Osteopenia        Past Surgical History:   Procedure Laterality Date    HX GI      HX HEART CATHETERIZATION  03/2018    2nd 4/2018,     HX RENAL ARTERY STENT Left 04/2018    HX TONSILLECTOMY      LAP,CHOLECYSTECTOMY      REMOVAL GALLBLADDER       Social History     Social History    Marital status:      Spouse name: N/A    Number of children: N/A    Years of education: N/A     Social History Main Topics    Smoking status: Current Every Day Smoker     Packs/day: 0.50     Years: 50.00     Types: Cigarettes    Smokeless tobacco: Never Used    Alcohol use No      Comment: occasionally     Drug use: No    Sexual activity: No     Other Topics Concern    None     Social History Narrative     Family History   Problem Relation Age of Onset    Heart Disease Mother      hx of valve surgery    Cancer Father        No Known Allergies    . Current Outpatient Prescriptions   Medication Sig Dispense Refill    umeclidinium-vilanterol (ANORO ELLIPTA) 62.5-25 mcg/actuation inhaler Take  by inhalation.  ticagrelor (BRILINTA) 90 mg tablet Take 1 Tab by mouth.  carvedilol (COREG) 12.5 mg tablet Take 1 Tab by mouth.  nitroglycerin (NITROSTAT) 0.4 mg SL tablet 0.4 mg by SubLINGual route every five (5) minutes as needed for Chest Pain. Up to 3 doses.  multivitamin (ONE A DAY) tablet Take 1 Tab by mouth daily.  tamsulosin (FLOMAX) 0.4 mg capsule Take 1 Cap by mouth daily. 90 Cap 3    hydrALAZINE (APRESOLINE) 50 mg tablet Take 1 Tab by mouth two (2) times a day. 180 Tab 3    rosuvastatin (CRESTOR) 20 mg tablet Take 20 mg by mouth nightly.  amLODIPine (NORVASC) 10 mg tablet TAKE 1 TABLET BY MOUTH DAILY. (Patient taking differently: 5 mg. TAKE 1 TABLET BY MOUTH DAILY. ) 90 Tab 3  omeprazole (PRILOSEC) 10 mg capsule Take 5 mg by mouth daily.  Cholecalciferol, Vitamin D3, (VITAMIN D3) 1,000 unit cap Take 400 Units by mouth daily.  VITAMIN A PO Take 2,100 Units by mouth daily.  aspirin 81 mg tablet Take 81 mg by mouth daily.  tamsulosin (FLOMAX) 0.4 mg capsule TAKE ONE CAPSULE BY MOUTH DAILY 90 Cap 2    fenofibric acid (TRILIPIX) 135 mg capsule Take 135 mg by mouth nightly.            Review of Systems:  HEENT: No epistaxis, no nasal drainage, no difficulty in swallowing, no redness in eyes  Respiratory: as above  Cardiovascular: no chest pain, no palpitations, no chronic leg edema, no syncope  Gastrointestinal: no abd pain, no vomiting, no diarrhea, no bleeding symptoms  Genitourinary: No urinary symptoms or hematuria  Integument/breast: No ulcers or rashes  Musculoskeletal:Neg  Neurological: No focal weakness, no seizures, no headaches  Behvioral/Psych: No anxiety, no depression  Constitutional: No fever, no chills, no weight loss, no night sweats     Objective:     Visit Vitals    /60 (BP 1 Location: Left arm, BP Patient Position: At rest)    Pulse (!) 52    Temp 97.9 °F (36.6 °C) (Oral)    Resp 20    Ht 5' 9\" (1.753 m)    Wt 73.5 kg (162 lb)    SpO2 98%    BMI 23.92 kg/m2        Physical Exam:   General: comfortable, no acute distress  HEENT: pupils reactive, sclera anicteric, EOM intact  Neck: No adenopathy or thyroid swelling, no lymphadenopathy or JVD, supple  CVS: S1S2 no murmurs  RS: Mod AE bilaterally, no tactile fremitus or egophony, no accessory muscle use  Abd: soft, non tender, no hepatosplenomegaly  Neuro: non focal, awake, alert  Extrm: +3 leg edema, clubbing or cyanosis  Skin: no rash    Data review:   Pertinent labs: CBC, BMP, LFT's    PFT:    Date FVC FEV1  FEV1/FVC [id number] TLC RV RV/TLC VC DLCO   7/6/2018 77 76 72 67 76 74 97 77 64                                           6 min walk test;6 minute walk distance: 306 meters, No significant O2 desaturation, Dyspnea index unchanged    Echo:from 2017- EF 55 %, no estimation of right heart pressures    Imaging:  I have personally reviewed the patients radiographs and have reviewed the reports:  CXR Results  (Last 48 hours)               07/06/18 1036  XR CHEST PA LAT Final result    Impression:  IMPRESSION:       Left lung base opacification as above. Narrative:  Chest PA and lateral       INDICATION: Shortness of breath       COMPARISON: 6/11       FINDINGS:   Two views of the chest were obtained. Small to moderate-sized opacification of   the left lung base likely a combination of pleural fluid and underlying   atelectasis/infiltrate. Recommend follow-up imaging after appropriate interval   therapy to document resolution. Cardiac silhouette and pulmonary vascularity are   within normal limits. No acute osseous abnormality. CT Results  (Last 48 hours)    None           Patient Active Problem List   Diagnosis Code    Dyslipidemia E78.5    Heart murmur R01.1    HTN (hypertension) I10    Urinary frequency R35.0    Urinary retention R33.9    Type II or unspecified type diabetes mellitus without mention of complication, not stated as uncontrolled E11.9    Diabetes mellitus type 2, controlled ([facility]) E11.9    Tobacco abuse Z72.0    Type 2 diabetes mellitus with nephropathy ([facility]) E11.21    MISTY (renal artery stenosis) ([facility]) I70.1     IMPRESSION:   · TIRADO: multifactorial with significant component from cardiac ischemia should have been corrected after revascularization but symptomatic decline in absence of proportionate Pulmonary functional abnormalities. Imaging with significant LLL atelectasis/infiltrate/mass and Pleural effusion ? Etiology. 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 Mixed mild obstructive and restrictive impairment with DLCO reduced to 64% predicted  · Current smoker, candidate for LDCT screening  · Persistent leg edema- ?  Right heart failure/constrictive pericarditis. Needs new echo and imaging and evaluation for Pulm HTN, VTE, / HENRY- cirrhosis  · CAD- s/p RCA stent x3 and now on dual antiplatelet therapy  · Renal artery stenosis- s/p stent  · KYERE - contrast  · Fatigue- multifactorial       RECOMMENDATIONS:   · Will obtain Ct scan chest ( non contrast) for further evaluation of LLL abnormalities and pleural effusion. Will likely need further diagnostics- thoracocentesis/ bronchoscopy. ( on antiplatelets so will have to discuss with cardiology- timing )  · Duplex/doppler lower extremities  · Will consider V/Q scan , ECHO for CTEPHT  · Will consider Abd CT,  · Continue ANORO for now- should be able to deescalate ( FEV1 preserved)  · Will follow for further completion of work up  · Discussed at length with patient and spouse  · Follow up -1 week     Health maintenance screens deferred to Primary care provider.      Porsche Astorga MD

## 2018-07-06 NOTE — LETTER
2018 2:31 PM 
 
Patient:  Familia Malave YOB: 1945 Date of Visit: 2018 Dear Jd Corcoran MD 
[address] [facility] [place] [address] VIA Facsimile: [phone] 
 : Thank you for referring Mr. Jered Rice to me for evaluation/treatment. Below are the relevant portions of my assessment and plan of care. [facility] Pulmonary, Critical Care, and Sleep Medicine Pulmonary Office Initial Consultation Name: Familia Malave : 1945 Date: 2018 Subjective:  
Patient has been referred for evaluation of: SOB Patient is a 68 y.o. male who has been a life long smoker- currently 10 cig. day . He has noticed SOB for several years which was predominately on exertion - walked 2-3 miles daily and felt winded but noticed acute decompensation after he had a stress test ( nuclear ) with cath and RCA stent. He seems to correlate the initiation of COREG to decline in symptoms. He had a second intervention with 2 more stents . Also more recently and renal artery stenting. He now can not walk more than 1 block. Climbs stairs at home slowly. Denies orthopnea or PND. Has noticed significant persistent swelling of lower extremities and extreme fatigue. Denies any acute preceeding viral/flu like illness. Denies fever/chills. Was recently started on [facility] by Dr. Karlos Luke and feels it helps. Reports recent findings of KYREE and proteinuria. SOB:  
Symptoms occur at exertion. Able to walk about 200 Feet. Can climb stairs. Activities of daily living are somewhat affected Denies orthopnea/ paroxysmal nocturnal dyspnea SOB relieved at rest 
 
Cough: very mild- not a complaint Denies Associated wheezing, chest pain, fever, chills, night sweats dyspepsia, reflux. Comorbid conditions include- DM, HTN, KLAUDIA. 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  
Diagnosis Date  Arthritis   
 hip and back  Cardiac echocardiogram 06/24/2014 EF 65%. No WMA. Normal fdiastolic fx. Mild AI. No significant chg from study of 9/10/10.  Cardiac nuclear imaging study, normal 04/03/2009 No ischemia or prior infarction. EF 68%. No reg'l WMA.  Chronic lung disease  Diabetes ([facility] [address] 11/2012  
 type II  
 Diabetes mellitus ([facility] [address]  Dyslipidemia  Essential hypertension  Gallstones  GERD (gastroesophageal reflux disease)  Heart murmur  HTN (hypertension)  Irregular heart beat  Kidney disease  Osteopenia Past Surgical History:  
Procedure Laterality Date  HX GI    
 HX HEART CATHETERIZATION  03/2018 2nd 4/2018,   
Bernetta Marking RENAL ARTERY STENT Left 04/2018  HX TONSILLECTOMY  LAP,CHOLECYSTECTOMY  REMOVAL GALLBLADDER Social History Social History  Marital status:  Spouse name: N/A  
 Number of children: N/A  
 Years of education: N/A Social History Main Topics  Smoking status: Current Every Day Smoker Packs/day: 0.50 Years: 50.00 Types: Cigarettes  Smokeless tobacco: Never Used  Alcohol use No  
   Comment: occasionally  Drug use: No  
 Sexual activity: No  
 
Other Topics Concern  None Social History Narrative Family History Problem Relation Age of Onset  Heart Disease Mother   
  hx of valve surgery  Cancer Father No Known Allergies Mckenna Mcdonnell Current Outpatient Prescriptions Medication Sig Dispense Refill  umeclidinium-vilanterol (ANORO ELLIPTA) 62.5-25 mcg/actuation inhaler Take  by inhalation.  ticagrelor (BRILINTA) 90 mg tablet Take 1 Tab by mouth.  carvedilol (COREG) 12.5 mg tablet Take 1 Tab by mouth.  nitroglycerin (NITROSTAT) 0.4 mg SL tablet 0.4 mg by SubLINGual route every five (5) minutes as needed for Chest Pain. Up to 3 doses.  multivitamin (ONE A DAY) tablet Take 1 Tab by mouth daily.  tamsulosin (FLOMAX) 0.4 mg capsule Take 1 Cap by mouth daily. 90 Cap 3  
 hydrALAZINE (APRESOLINE) 50 mg tablet Take 1 Tab by mouth two (2) times a day. 180 Tab 3  
 rosuvastatin (CRESTOR) 20 mg tablet Take 20 mg by mouth nightly.  amLODIPine (NORVASC) 10 mg tablet TAKE 1 TABLET BY MOUTH DAILY. (Patient taking differently: 5 mg. TAKE 1 TABLET BY MOUTH DAILY. ) 90 Tab 3  
 omeprazole (PRILOSEC) 10 mg capsule Take 5 mg by mouth daily.  Cholecalciferol, Vitamin D3, (VITAMIN D3) 1,000 unit cap Take 400 Units by mouth daily.  VITAMIN A PO Take 2,100 Units by mouth daily.  aspirin 81 mg tablet Take 81 mg by mouth daily.  tamsulosin (FLOMAX) 0.4 mg capsule TAKE ONE CAPSULE BY MOUTH DAILY 90 Cap 2  
 fenofibric acid (TRILIPIX) 135 mg capsule Take 135 mg by mouth nightly. Review of Systems: 
HEENT: No epistaxis, no nasal drainage, no difficulty in swallowing, no redness in eyes Respiratory: as above Cardiovascular: no chest pain, no palpitations, no chronic leg edema, no syncope Gastrointestinal: no abd pain, no vomiting, no diarrhea, no bleeding symptoms Genitourinary: No urinary symptoms or hematuria Integument/breast: No ulcers or rashes Musculoskeletal:Neg 
Neurological: No focal weakness, no seizures, no headaches Behvioral/Psych: No anxiety, no depression Constitutional: No fever, no chills, no weight loss, no night sweats Objective:  
 
Visit Vitals  /60 (BP 1 Location: Left arm, BP Patient Position: At rest)  Pulse (!) 52  Temp 97.9 °F (36.6 °C) (Oral)  Resp 20  
 Ht 5' 9\" (1.753 m)  Wt 73.5 kg (162 lb)  SpO2 98%  BMI 23.92 kg/m2 Physical Exam:  
General: comfortable, no acute distress HEENT: pupils reactive, sclera anicteric, EOM intact Neck: No adenopathy or thyroid swelling, no lymphadenopathy or JVD, supple CVS: S1S2 no murmurs RS: Mod AE bilaterally, no tactile fremitus or egophony, no accessory muscle use Abd: soft, non tender, no hepatosplenomegaly Neuro: non focal, awake, alert Extrm: +3 leg edema, clubbing or cyanosis Skin: no rash Data review:  
Pertinent labs: CBC, BMP, LFT's PFT: 
 
Date FVC FEV1  FEV1/FVC [id number] TLC RV RV/TLC VC DLCO  
7/6/2018 77 76 72 67 76 74 97 77 64  
           
           
           
 
 
6 min walk test;6 minute walk distance: 306 meters, No significant O2 desaturation, Dyspnea index unchanged Echo:from 2017- EF 55 %, no estimation of right heart pressures Imaging: 
I have personally reviewed the patients radiographs and have reviewed the reports: CXR Results  (Last 48 hours) 07/06/18 1036  XR CHEST PA LAT Final result Impression:  IMPRESSION:  
   
Left lung base opacification as above. Narrative:  Chest PA and lateral  
   
INDICATION: Shortness of breath COMPARISON: 6/11 FINDINGS:  
Two views of the chest were obtained. Small to moderate-sized opacification of  
the left lung base likely a combination of pleural fluid and underlying  
atelectasis/infiltrate. Recommend follow-up imaging after appropriate interval  
therapy to document resolution. Cardiac silhouette and pulmonary vascularity are  
within normal limits. No acute osseous abnormality. CT Results  (Last 48 hours) None Patient Active Problem List  
Diagnosis Code  Dyslipidemia E78.5  Heart murmur R01.1  HTN (hypertension) I10  
 Urinary frequency R35.0  
 Urinary retention R33.9  Type II or unspecified type diabetes mellitus without mention of complication, not stated as uncontrolled E11.9  Diabetes mellitus type 2, controlled ([facility] [address] E11.9  Tobacco abuse Z72.0  Type 2 diabetes mellitus with nephropathy (HCC) E11.21  
 MISTY (renal artery stenosis) ([facility]) I70.1 IMPRESSION:  
· TIRADO: multifactorial with significant component from cardiac ischemia should have been corrected after revascularization but symptomatic decline in absence of proportionate Pulmonary functional abnormalities. Imaging with significant LLL atelectasis/infiltrate/mass and Pleural effusion ? Etiology. 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 Mixed mild obstructive and restrictive impairment with DLCO reduced to 64% predicted · Current smoker, candidate for LDCT screening · Persistent leg edema- ? Right heart failure/constrictive pericarditis. Needs new echo and imaging and evaluation for Pulm HTN, VTE, / HENRY- cirrhosis · CAD- s/p RCA stent x3 and now on dual antiplatelet therapy · Renal artery stenosis- s/p stent · KYREE - contrast 
· Fatigue- multifactorial   
  
RECOMMENDATIONS:  
· Will obtain Ct scan chest ( non contrast) for further evaluation of LLL abnormalities and pleural effusion. Will likely need further diagnostics- thoracocentesis/ bronchoscopy. ( on antiplatelets so will have to discuss with cardiology- timing ) · Duplex/doppler lower extremities · Will consider V/Q scan , ECHO for CTEPHT · Will consider Abd CT, 
· Continue ANORO for now- should be able to deescalate ( FEV1 preserved) · Will follow for further completion of work up · Discussed at length with patient and spouse · Follow up -1 week Health maintenance screens deferred to Primary care provider. Angela Tsocano MD 
 
 
 
 
 
 
If you have questions, please do not hesitate to call me. I look forward to following Mr. Maynard along with you.  
 
 
 
Sincerely, 
 
 
Angela Toscano MD

## 2018-07-06 NOTE — PROGRESS NOTES
Chief Complaint   Patient presents with    COPD     1. Have you been to the ER, urgent care clinic since your last visit? Hospitalized since your last visit? Yes Where: [facility]    2. Have you seen or consulted any other health care providers outside of the [facility] since your last visit? Include any pap smears or colon screening.  Yes Dr Marissa Crowell

## 2018-07-06 NOTE — PROGRESS NOTES
[facility]    [address] [place] [address] 231, [id number]      SIMPLE PULMONARY STRESS TEST - 6 MINUTE WALK    PATIENT NAME: Jessica Hernandez    DATE: 7/6/2018     YOB: 1945     AGE: 68 y.o. DIAGNOSIS:   Encounter Diagnoses   Name Primary?  Chronic obstructive pulmonary disease, unspecified COPD type ([facility] [address] Yes    TIRADO (dyspnea on exertion)     Current smoker     Coronary artery disease involving native coronary artery of native heart without angina pectoris     Varicose veins of both legs with edema     Pleural effusion on left          TECHNICIAN: Corin Villaseñor, MICKI         PHYSICIAN: Ivanna       Visit Vitals    /60 (BP 1 Location: Left arm, BP Patient Position: At rest)    Pulse (!) 52    Temp 97.9 °F (36.6 °C) (Oral)    Resp 20    Ht 5' 9\" (1.753 m)    Wt 73.5 kg (162 lb)    SpO2 98%    BMI 23.92 kg/m2        RESTING DATA:  Dyspnea Scale (1-10):    2 SOB    EXERCISE DATA:   6 MINUTE WALK - HALLWAY (34 METERS)      1   RA    98 % SAT   58 HR   2  SOB   1.5 Laps x 34m = 51m  2   RA    97 % SAT   60 HR   3 SOB    1.5 Laps x 34m = 51m  3   RA    97 % SAT   62 HR   3 SOB    1.5 Laps x 34m = 51m  4   RA    98 % SAT   63 HR   3 SOB    1.5 Laps x 34m = 51m  5   RA    98 % SAT   63 HR   3 SOB    1.5 Laps x 34m = 51m  6   RA    98 % SAT   64 HR   4 SOB    1.5 Laps x 34m = 51m    TOTAL DISTANCE:   306M    RECOVERY DATA:      1   RA    99 % SAT   53 HR   20 RR   170/56 BP   3 SOB  2   RA    99 % SAT   52 HR   20 RR   164/42 BP   2 SOB      TECHNICIAN COMMENTS: Patient tolerated walk well. Did not take Coreg today. Will take it when he eats.  make aware of BP.

## 2018-07-06 NOTE — COMMUNICATION BODY
[facility]  Pulmonary, Critical Care, and Sleep Medicine      Pulmonary Office Initial Consultation    Name: Elliott Molina     : 1945     Date: 2018        Subjective:   Patient has been referred for evaluation of: SOB    Patient is a 68 y.o. male who has been a life long smoker- currently 10 cig. day . He has noticed SOB for several years which was predominately on exertion - walked 2-3 miles daily and felt winded but noticed acute decompensation after he had a stress test ( nuclear ) with cath and RCA stent. He seems to correlate the initiation of COREG to decline in symptoms. He had a second intervention with 2 more stents . Also more recently and renal artery stenting. He now can not walk more than 1 block. Climbs stairs at home slowly. Denies orthopnea or PND. Has noticed significant persistent swelling of lower extremities and extreme fatigue. Denies any acute preceeding viral/flu like illness. Denies fever/chills. Was recently started on [facility] by Dr. Kasey Sims and feels it helps. Reports recent findings of KYREE and proteinuria. SOB:   Symptoms occur at exertion. Able to walk about 200 Feet. Can climb stairs. Activities of daily living are somewhat affected  Denies orthopnea/ paroxysmal nocturnal dyspnea  SOB relieved at rest    Cough: very mild- not a complaint  Denies Associated wheezing, chest pain, fever, chills, night sweats dyspepsia, reflux. Comorbid conditions include- DM, HTN, KLAUDIA. 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 No WMA. Normal fdiastolic fx. Mild AI. No significant chg from study of 9/10/10.  Cardiac nuclear imaging study, normal 2009    No ischemia or prior infarction. EF 68%. No reg'l WMA.       Chronic lung disease     Diabetes ([facility] [address] 2012    type II    Diabetes mellitus ([facility] [address]     Dyslipidemia     Essential hypertension     Gallstones     GERD (gastroesophageal reflux disease)     Heart murmur     HTN (hypertension)     Irregular heart beat     Kidney disease     Osteopenia        Past Surgical History:   Procedure Laterality Date    HX GI      HX HEART CATHETERIZATION  03/2018    2nd 4/2018,     HX RENAL ARTERY STENT Left 04/2018    HX TONSILLECTOMY      LAP,CHOLECYSTECTOMY      REMOVAL GALLBLADDER       Social History     Social History    Marital status:      Spouse name: N/A    Number of children: N/A    Years of education: N/A     Social History Main Topics    Smoking status: Current Every Day Smoker     Packs/day: 0.50     Years: 50.00     Types: Cigarettes    Smokeless tobacco: Never Used    Alcohol use No      Comment: occasionally     Drug use: No    Sexual activity: No     Other Topics Concern    None     Social History Narrative     Family History   Problem Relation Age of Onset    Heart Disease Mother      hx of valve surgery    Cancer Father        No Known Allergies    . Current Outpatient Prescriptions   Medication Sig Dispense Refill    umeclidinium-vilanterol (ANORO ELLIPTA) 62.5-25 mcg/actuation inhaler Take  by inhalation.  ticagrelor (BRILINTA) 90 mg tablet Take 1 Tab by mouth.  carvedilol (COREG) 12.5 mg tablet Take 1 Tab by mouth.  nitroglycerin (NITROSTAT) 0.4 mg SL tablet 0.4 mg by SubLINGual route every five (5) minutes as needed for Chest Pain. Up to 3 doses.  multivitamin (ONE A DAY) tablet Take 1 Tab by mouth daily.  tamsulosin (FLOMAX) 0.4 mg capsule Take 1 Cap by mouth daily. 90 Cap 3    hydrALAZINE (APRESOLINE) 50 mg tablet Take 1 Tab by mouth two (2) times a day. 180 Tab 3    rosuvastatin (CRESTOR) 20 mg tablet Take 20 mg by mouth nightly.  amLODIPine (NORVASC) 10 mg tablet TAKE 1 TABLET BY MOUTH DAILY. (Patient taking differently: 5 mg. TAKE 1 TABLET BY MOUTH DAILY. ) 90 Tab 3  omeprazole (PRILOSEC) 10 mg capsule Take 5 mg by mouth daily.  Cholecalciferol, Vitamin D3, (VITAMIN D3) 1,000 unit cap Take 400 Units by mouth daily.  VITAMIN A PO Take 2,100 Units by mouth daily.  aspirin 81 mg tablet Take 81 mg by mouth daily.  tamsulosin (FLOMAX) 0.4 mg capsule TAKE ONE CAPSULE BY MOUTH DAILY 90 Cap 2    fenofibric acid (TRILIPIX) 135 mg capsule Take 135 mg by mouth nightly.            Review of Systems:  HEENT: No epistaxis, no nasal drainage, no difficulty in swallowing, no redness in eyes  Respiratory: as above  Cardiovascular: no chest pain, no palpitations, no chronic leg edema, no syncope  Gastrointestinal: no abd pain, no vomiting, no diarrhea, no bleeding symptoms  Genitourinary: No urinary symptoms or hematuria  Integument/breast: No ulcers or rashes  Musculoskeletal:Neg  Neurological: No focal weakness, no seizures, no headaches  Behvioral/Psych: No anxiety, no depression  Constitutional: No fever, no chills, no weight loss, no night sweats     Objective:     Visit Vitals    /60 (BP 1 Location: Left arm, BP Patient Position: At rest)    Pulse (!) 52    Temp 97.9 °F (36.6 °C) (Oral)    Resp 20    Ht 5' 9\" (1.753 m)    Wt 73.5 kg (162 lb)    SpO2 98%    BMI 23.92 kg/m2        Physical Exam:   General: comfortable, no acute distress  HEENT: pupils reactive, sclera anicteric, EOM intact  Neck: No adenopathy or thyroid swelling, no lymphadenopathy or JVD, supple  CVS: S1S2 no murmurs  RS: Mod AE bilaterally, no tactile fremitus or egophony, no accessory muscle use  Abd: soft, non tender, no hepatosplenomegaly  Neuro: non focal, awake, alert  Extrm: +3 leg edema, clubbing or cyanosis  Skin: no rash    Data review:   Pertinent labs: CBC, BMP, LFT's    PFT:    Date FVC FEV1  FEV1/FVC [id number] TLC RV RV/TLC VC DLCO   7/6/2018 77 76 72 67 76 74 97 77 64                                           6 min walk test;6 minute walk distance: 306 meters, No significant O2 desaturation, Dyspnea index unchanged    Echo:from 2017- EF 55 %, no estimation of right heart pressures    Imaging:  I have personally reviewed the patients radiographs and have reviewed the reports:  CXR Results  (Last 48 hours)               07/06/18 1036  XR CHEST PA LAT Final result    Impression:  IMPRESSION:       Left lung base opacification as above. Narrative:  Chest PA and lateral       INDICATION: Shortness of breath       COMPARISON: 6/11       FINDINGS:   Two views of the chest were obtained. Small to moderate-sized opacification of   the left lung base likely a combination of pleural fluid and underlying   atelectasis/infiltrate. Recommend follow-up imaging after appropriate interval   therapy to document resolution. Cardiac silhouette and pulmonary vascularity are   within normal limits. No acute osseous abnormality. CT Results  (Last 48 hours)    None           Patient Active Problem List   Diagnosis Code    Dyslipidemia E78.5    Heart murmur R01.1    HTN (hypertension) I10    Urinary frequency R35.0    Urinary retention R33.9    Type II or unspecified type diabetes mellitus without mention of complication, not stated as uncontrolled E11.9    Diabetes mellitus type 2, controlled ([facility]) E11.9    Tobacco abuse Z72.0    Type 2 diabetes mellitus with nephropathy ([facility]) E11.21    MISTY (renal artery stenosis) ([facility]) I70.1     IMPRESSION:   · TIRADO: multifactorial with significant component from cardiac ischemia should have been corrected after revascularization but symptomatic decline in absence of proportionate Pulmonary functional abnormalities. Imaging with significant LLL atelectasis/infiltrate/mass and Pleural effusion ? Etiology. 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 Mixed mild obstructive and restrictive impairment with DLCO reduced to 64% predicted  · Current smoker, candidate for LDCT screening  · Persistent leg edema- ?  Right heart failure/constrictive pericarditis. Needs new echo and imaging and evaluation for Pulm HTN, VTE, / HENRY- cirrhosis  · CAD- s/p RCA stent x3 and now on dual antiplatelet therapy  · Renal artery stenosis- s/p stent  · KYREE - contrast  · Fatigue- multifactorial       RECOMMENDATIONS:   · Will obtain Ct scan chest ( non contrast) for further evaluation of LLL abnormalities and pleural effusion. Will likely need further diagnostics- thoracocentesis/ bronchoscopy. ( on antiplatelets so will have to discuss with cardiology- timing )  · Duplex/doppler lower extremities  · Will consider V/Q scan , ECHO for CTEPHT  · Will consider Abd CT,  · Continue ANORO for now- should be able to deescalate ( FEV1 preserved)  · Will follow for further completion of work up  · Discussed at length with patient and spouse  · Follow up -1 week     Health maintenance screens deferred to Primary care provider.      Geovanny Goetz MD

## 2018-07-06 NOTE — PROGRESS NOTES
Verbal Order with read back per Dr. Alee Coley MD  For PFT smart panel. AMB POC PFT complete w/ bronchodilator  AMB POC PFT complete w/o bronchodilator  Gas Dilute/ wash out lung vol w/wo distrib vet & vol  Diffusing capacity    Dr. Alee Coley MD will co-sign the orders.

## 2018-07-09 ENCOUNTER — HOSPITAL ENCOUNTER (OUTPATIENT)
Dept: CT IMAGING | Age: 73
Discharge: HOME | End: 2018-07-09
Attending: INTERNAL MEDICINE
Payer: MEDICARE

## 2018-07-09 ENCOUNTER — DOCUMENTATION ONLY (OUTPATIENT)
Dept: PULMONOLOGY | Age: 73
End: 2018-07-09

## 2018-07-09 DIAGNOSIS — J90 PLEURAL EFFUSION ON LEFT: ICD-10-CM

## 2018-07-09 DIAGNOSIS — R06.09 DOE (DYSPNEA ON EXERTION): ICD-10-CM

## 2018-07-09 PROCEDURE — 71250 CT THORAX DX C-: CPT

## 2018-07-09 NOTE — PROGRESS NOTES
Per [email or web address] from 7/9/18-8/8/18 for CT of chest scheduled MV [email or web address]. Auth given to [email or web address]

## 2018-07-11 ENCOUNTER — DOCUMENTATION ONLY (OUTPATIENT)
Dept: PULMONOLOGY | Age: 73
End: 2018-07-11

## 2018-07-11 NOTE — PROGRESS NOTES
Ct Scan chest reviewed    IMPRESSION:  1. Mild stable left pleural effusion with adjacent left basilar atelectasis. 2. No evidence of acute pneumonia or mass. 3. Cardiomegaly. Atherosclerotic disease. 4. Right thyroid nodule can be assessed with ultrasound. Discussed with patient- options for considering thoracocentesis now vs awaiting response to cardiopulmonary rehab and follow up CXR in 6 weeks. All in agreement to check repeat CXR in 6 weeks. Will arrange for follow up appointment.

## 2018-07-12 ENCOUNTER — HOSPITAL ENCOUNTER (OUTPATIENT)
Dept: VASCULAR SURGERY | Age: 73
Discharge: HOME | End: 2018-07-12
Attending: INTERNAL MEDICINE
Payer: MEDICARE

## 2018-07-12 DIAGNOSIS — R06.09 DOE (DYSPNEA ON EXERTION): ICD-10-CM

## 2018-07-12 PROCEDURE — 93970 EXTREMITY STUDY: CPT

## 2018-08-29 ENCOUNTER — OFFICE VISIT (OUTPATIENT)
Dept: PULMONOLOGY | Age: 73
End: 2018-08-29

## 2018-08-29 VITALS
BODY MASS INDEX: 24.29 KG/M2 | SYSTOLIC BLOOD PRESSURE: 132 MMHG | TEMPERATURE: 98.2 F | HEART RATE: 48 BPM | DIASTOLIC BLOOD PRESSURE: 54 MMHG | WEIGHT: 164 LBS | RESPIRATION RATE: 16 BRPM | HEIGHT: 69 IN | OXYGEN SATURATION: 97 %

## 2018-08-29 DIAGNOSIS — E11.9 CONTROLLED TYPE 2 DIABETES MELLITUS WITHOUT COMPLICATION, WITHOUT LONG-TERM CURRENT USE OF INSULIN (HCC): ICD-10-CM

## 2018-08-29 DIAGNOSIS — I10 ESSENTIAL HYPERTENSION: ICD-10-CM

## 2018-08-29 DIAGNOSIS — J90 PLEURAL EFFUSION ON LEFT: Primary | ICD-10-CM

## 2018-08-29 PROBLEM — J43.9 COPD (CHRONIC OBSTRUCTIVE PULMONARY DISEASE) WITH EMPHYSEMA (HCC): Status: ACTIVE | Noted: 2018-07-06

## 2018-08-29 RX ORDER — DIPHENHYDRAMINE HCL 25 MG
25 CAPSULE ORAL
COMMUNITY

## 2018-08-29 RX ORDER — ACETAMINOPHEN 500 MG
TABLET ORAL
COMMUNITY

## 2018-08-29 RX ORDER — LISINOPRIL 10 MG/1
TABLET ORAL DAILY
COMMUNITY

## 2018-08-29 NOTE — PROGRESS NOTES
[facility] Pulmonary, Critical Care, and Sleep Medicine Pulmonary Office follow up Name: Karen Pardo : 1945 Date: 2018 Subjective:  
Patient has been referred for evaluation of: SOB 
 
18: 
Feels much better after starting cardiopulmonary rehab. Less SOB. Denies any chest pain No fever/chills Not using any inhalers Denies pedal edema Here to discuss imaging- had CT chest and follow up CXR today. HPI: 
Patient is a 68 y.o. male who has been a life long smoker- currently 10 cig. day . He has noticed SOB for several years which was predominately on exertion - walked 2-3 miles daily and felt winded but noticed acute decompensation after he had a stress test ( nuclear ) with cath and RCA stent. He seems to correlate the initiation of COREG to decline in symptoms. He had a second intervention with 2 more stents . Also more recently and renal artery stenting. He now can not walk more than 1 block. Climbs stairs at home slowly. Denies orthopnea or PND. Has noticed significant persistent swelling of lower extremities and extreme fatigue. Denies any acute preceeding viral/flu like illness. Denies fever/chills. Was recently started on [facility] by Dr. Jose Luis Finch and feels it helps. Reports recent findings of KYREE and proteinuria. SOB:  
Symptoms occur at exertion. Able to walk about 200 Feet. Can climb stairs. Activities of daily living are somewhat affected Denies orthopnea/ paroxysmal nocturnal dyspnea SOB relieved at rest 
 
Cough: very mild- not a complaint Denies Associated wheezing, chest pain, fever, chills, night sweats dyspepsia, reflux. Comorbid conditions include- DM, HTN, KLAUDIA. 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  
Diagnosis Date  Arthritis   
 hip and back  Cardiac echocardiogram 2014 EF 65%. No WMA. Normal fdiastolic fx. Mild AI. No significant chg from study of 9/10/10.  Cardiac nuclear imaging study, normal 04/03/2009 No ischemia or prior infarction. EF 68%. No reg'l WMA.  Chronic lung disease  Diabetes ([facility] [address] 11/2012  
 type II  
 Diabetes mellitus ([facility] [address]  Dyslipidemia  Essential hypertension  Gallstones  GERD (gastroesophageal reflux disease)  Heart murmur  HTN (hypertension)  Irregular heart beat  Kidney disease  Osteopenia Past Surgical History:  
Procedure Laterality Date  HX GI    
 HX HEART CATHETERIZATION  03/2018 2nd 4/2018,   
[facility]omo Art RENAL ARTERY STENT Left 04/2018  HX TONSILLECTOMY  LAP,CHOLECYSTECTOMY  REMOVAL GALLBLADDER No Known Allergies Current Outpatient Prescriptions Medication Sig Dispense Refill  lisinopril (PRINIVIL, ZESTRIL) 10 mg tablet Take  by mouth daily.  psyllium (METAMUCIL) packet Take 1 Packet by mouth daily.  acetaminophen (TYLENOL EXTRA STRENGTH) 500 mg tablet Take  by mouth every six (6) hours as needed for Pain.  diphenhydrAMINE (BENADRYL) 25 mg capsule Take 25 mg by mouth every six (6) hours as needed.  ticagrelor (BRILINTA) 90 mg tablet Take 1 Tab by mouth.  carvedilol (COREG) 12.5 mg tablet Take 1 Tab by mouth.  nitroglycerin (NITROSTAT) 0.4 mg SL tablet 0.4 mg by SubLINGual route every five (5) minutes as needed for Chest Pain. Up to 3 doses.  multivitamin (ONE A DAY) tablet Take 1 Tab by mouth daily.  tamsulosin (FLOMAX) 0.4 mg capsule Take 1 Cap by mouth daily. 90 Cap 3  
 hydrALAZINE (APRESOLINE) 50 mg tablet Take 1 Tab by mouth two (2) times a day. 180 Tab 3  
 rosuvastatin (CRESTOR) 20 mg tablet Take 20 mg by mouth nightly.  amLODIPine (NORVASC) 10 mg tablet TAKE 1 TABLET BY MOUTH DAILY. (Patient taking differently: 5 mg. TAKE 1 TABLET BY MOUTH DAILY. ) 90 Tab 3  
  omeprazole (PRILOSEC) 10 mg capsule Take 5 mg by mouth daily.  Cholecalciferol, Vitamin D3, (VITAMIN D3) 1,000 unit cap Take 400 Units by mouth daily.  aspirin 81 mg tablet Take 81 mg by mouth daily.  umeclidinium-vilanterol (ANORO ELLIPTA) 62.5-25 mcg/actuation inhaler Take  by inhalation.  VITAMIN A PO Take 2,100 Units by mouth daily.  fenofibric acid (TRILIPIX) 135 mg capsule Take 135 mg by mouth nightly. Review of Systems: 
HEENT: No epistaxis, no nasal drainage, no difficulty in swallowing, no redness in eyes Respiratory: as above Cardiovascular: no chest pain, no palpitations, no chronic leg edema, no syncope Gastrointestinal: no abd pain, no vomiting, no diarrhea, no bleeding symptoms Genitourinary: No urinary symptoms or hematuria Integument/breast: No ulcers or rashes Musculoskeletal:Neg 
Neurological: No focal weakness, no seizures, no headaches Behvioral/Psych: No anxiety, no depression Constitutional: No fever, no chills, no weight loss, no night sweats Objective:  
 
Visit Vitals  /54 (BP 1 Location: Left arm, BP Patient Position: Sitting)  Pulse (!) 48  Temp 98.2 °F (36.8 °C) (Oral)  Resp 16  
 Ht 5' 9\" (1.753 m)  Wt 74.4 kg (164 lb)  SpO2 97%  BMI 24.22 kg/m2 Physical Exam:  
General: comfortable, no acute distress HEENT: pupils reactive, sclera anicteric, EOM intact Neck: No adenopathy or thyroid swelling, no lymphadenopathy or JVD, supple CVS: S1S2 no murmurs RS: Mod AE bilaterally, no tactile fremitus or egophony, no accessory muscle use Abd: soft, non tender, no hepatosplenomegaly Neuro: non focal, awake, alert Extrm: no edema, clubbing or cyanosis Skin: no rash Data review:  
Pertinent labs: CBC, BMP, LFT's PFT: 
 
Date FVC FEV1  FEV1/FVC [id number] TLC RV RV/TLC VC DLCO  
7/6/2018 77 76 72 67 76 74 97 77 64 6 min walk test;6 minute walk distance: 306 meters, No significant O2 desaturation, Dyspnea index unchanged Echo:from 2017- EF 55 %, no estimation of right heart pressures Imaging: 
I have personally reviewed the patients radiographs and have reviewed the reports: CXR Results  (Last 48 hours) 08/29/18 1626  XR CHEST PA LAT Final result Impression:  Impression: No acute cardiopulmonary abnormalities. The previously seen left pleural  
effusion has almost completely resolved with minimal residual fluid on the left  
side. Narrative:  CHEST PA AND LATERAL:  
   
CPT CODE: 09588 COMPARISON: 7/6/2018 INDICATION: Pleural effusion FINDINGS: Heart and mediastinum are normal. The aorta is calcified. Mild apical  
pleural thickening is present bilaterally. There is minimal blunting of the left  
costophrenic angle. The previously seen left pleural effusion has decreased  
significantly in size. Extensive degenerative changes are seen involving the  
thoracic spine with evidence of diffuse idiopathic skeletal hyperostosis. CT Results  (Last 48 hours) None Patient Active Problem List  
Diagnosis Code  Dyslipidemia E78.5  Heart murmur R01.1  HTN (hypertension) I10  
 Urinary frequency R35.0  
 Urinary retention R33.9  Type II or unspecified type diabetes mellitus without mention of complication, not stated as uncontrolled E11.9  Diabetes mellitus type 2, controlled ([facility] [address] E11.9  Tobacco abuse Z72.0  Type 2 diabetes mellitus with nephropathy ([facility]) E11.21  
 MISTY (renal artery stenosis) ([facility]) I70.1  COPD (chronic obstructive pulmonary disease) with emphysema ([facility]) J43.9 IMPRESSION:  
· TIRADO: multifactorial with significant component from cardiac ischemia should have been corrected after revascularization but symptomatic decline in absence of proportionate Pulmonary functional abnormalities.  Imaging with Pleural effusion which has resolved on follow up CXR today. 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 Mixed mild obstructive and restrictive impairment with DLCO reduced to 64% predicted · Current smoker, candidate for LDCT screening · Persistent leg edema- ? Right heart failure/constrictive pericarditis. Needs new echo and imaging and evaluation for Pulm HTN, VTE, / HENRY- cirrhosis · CAD- s/p RCA stent x3 and now on dual antiplatelet therapy · Renal artery stenosis- s/p stent · KYREE - contrast 
· Fatigue- multifactorial   
  
RECOMMENDATIONS:  
· Will monitor for any recurrence of symptoms · Discussed CXR findings with patient and spouse · Continue cardiopulmonary rehab · LDCT candidate- annual 
· Discussed at length with patient and spouse · Follow up -as needed Health maintenance screens deferred to Primary care provider.  
  
Monica Cantu MD

## 2018-08-29 NOTE — LETTER
2018 6:40 AM 
 
Patient:  Kevin Colmenares YOB: 1945 Date of Visit: 2018 Dear Art Euceda., MD 
[address] [facility] [address] VIA Facsimile: [phone] 
 : Thank you for referring Mr. Curtis Simmons to me for evaluation/treatment. Below are the relevant portions of my assessment and plan of care. [facility] Pulmonary, Critical Care, and Sleep Medicine Pulmonary Office follow up Name: Kevin Colmenares : 1945 Date: 2018 Subjective:  
Patient has been referred for evaluation of: SOB 
 
18: 
Feels much better after starting cardiopulmonary rehab. Less SOB. Denies any chest pain No fever/chills Not using any inhalers Denies pedal edema Here to discuss imaging- had CT chest and follow up CXR today. HPI: 
Patient is a 68 y.o. male who has been a life long smoker- currently 10 cig. day . He has noticed SOB for several years which was predominately on exertion - walked 2-3 miles daily and felt winded but noticed acute decompensation after he had a stress test ( nuclear ) with cath and RCA stent. He seems to correlate the initiation of COREG to decline in symptoms. He had a second intervention with 2 more stents . Also more recently and renal artery stenting. He now can not walk more than 1 block. Climbs stairs at home slowly. Denies orthopnea or PND. Has noticed significant persistent swelling of lower extremities and extreme fatigue. Denies any acute preceeding viral/flu like illness. Denies fever/chills. Was recently started on [facility] by Dr. Markell Dasilva and feels it helps. Reports recent findings of KYREE and proteinuria. SOB:  
Symptoms occur at exertion. Able to walk about 200 Feet. Can climb stairs. Activities of daily living are somewhat affected Denies orthopnea/ paroxysmal nocturnal dyspnea SOB relieved at rest 
 
Cough: very mild- not a complaint Denies Associated wheezing, chest pain, fever, chills, night sweats dyspepsia, reflux. Comorbid conditions include- DM, HTN, KLAUDIA. 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  
Diagnosis Date  Arthritis   
 hip and back  Cardiac echocardiogram 06/24/2014 EF 65%. No WMA. Normal fdiastolic fx. Mild AI. No significant chg from study of 9/10/10.  Cardiac nuclear imaging study, normal 04/03/2009 No ischemia or prior infarction. EF 68%. No reg'l WMA.  Chronic lung disease  Diabetes ([facility] [address] 11/2012  
 type II  
 Diabetes mellitus ([facility] [address]  Dyslipidemia  Essential hypertension  Gallstones  GERD (gastroesophageal reflux disease)  Heart murmur  HTN (hypertension)  Irregular heart beat  Kidney disease  Osteopenia Past Surgical History:  
Procedure Laterality Date  HX GI    
 HX HEART CATHETERIZATION  03/2018 2nd 4/2018,   
Teri Rebollar RENAL ARTERY STENT Left 04/2018  HX TONSILLECTOMY  LAP,CHOLECYSTECTOMY  REMOVAL GALLBLADDER No Known Allergies Current Outpatient Prescriptions Medication Sig Dispense Refill  lisinopril (PRINIVIL, ZESTRIL) 10 mg tablet Take  by mouth daily.  psyllium (METAMUCIL) packet Take 1 Packet by mouth daily.  acetaminophen (TYLENOL EXTRA STRENGTH) 500 mg tablet Take  by mouth every six (6) hours as needed for Pain.  diphenhydrAMINE (BENADRYL) 25 mg capsule Take 25 mg by mouth every six (6) hours as needed.  ticagrelor (BRILINTA) 90 mg tablet Take 1 Tab by mouth.  carvedilol (COREG) 12.5 mg tablet Take 1 Tab by mouth.  nitroglycerin (NITROSTAT) 0.4 mg SL tablet 0.4 mg by SubLINGual route every five (5) minutes as needed for Chest Pain. Up to 3 doses.  multivitamin (ONE A DAY) tablet Take 1 Tab by mouth daily.  tamsulosin (FLOMAX) 0.4 mg capsule Take 1 Cap by mouth daily.  90 Cap 3  
  hydrALAZINE (APRESOLINE) 50 mg tablet Take 1 Tab by mouth two (2) times a day. 180 Tab 3  
 rosuvastatin (CRESTOR) 20 mg tablet Take 20 mg by mouth nightly.  amLODIPine (NORVASC) 10 mg tablet TAKE 1 TABLET BY MOUTH DAILY. (Patient taking differently: 5 mg. TAKE 1 TABLET BY MOUTH DAILY. ) 90 Tab 3  
 omeprazole (PRILOSEC) 10 mg capsule Take 5 mg by mouth daily.  Cholecalciferol, Vitamin D3, (VITAMIN D3) 1,000 unit cap Take 400 Units by mouth daily.  aspirin 81 mg tablet Take 81 mg by mouth daily.  umeclidinium-vilanterol (ANORO ELLIPTA) 62.5-25 mcg/actuation inhaler Take  by inhalation.  VITAMIN A PO Take 2,100 Units by mouth daily.  fenofibric acid (TRILIPIX) 135 mg capsule Take 135 mg by mouth nightly. Review of Systems: 
HEENT: No epistaxis, no nasal drainage, no difficulty in swallowing, no redness in eyes Respiratory: as above Cardiovascular: no chest pain, no palpitations, no chronic leg edema, no syncope Gastrointestinal: no abd pain, no vomiting, no diarrhea, no bleeding symptoms Genitourinary: No urinary symptoms or hematuria Integument/breast: No ulcers or rashes Musculoskeletal:Neg 
Neurological: No focal weakness, no seizures, no headaches Behvioral/Psych: No anxiety, no depression Constitutional: No fever, no chills, no weight loss, no night sweats Objective:  
 
Visit Vitals  /54 (BP 1 Location: Left arm, BP Patient Position: Sitting)  Pulse (!) 48  Temp 98.2 °F (36.8 °C) (Oral)  Resp 16  
 Ht 5' 9\" (1.753 m)  Wt 74.4 kg (164 lb)  SpO2 97%  BMI 24.22 kg/m2 Physical Exam:  
General: comfortable, no acute distress HEENT: pupils reactive, sclera anicteric, EOM intact Neck: No adenopathy or thyroid swelling, no lymphadenopathy or JVD, supple CVS: S1S2 no murmurs RS: Mod AE bilaterally, no tactile fremitus or egophony, no accessory muscle use Abd: soft, non tender, no hepatosplenomegaly Neuro: non focal, awake, alert Extrm: no edema, clubbing or cyanosis Skin: no rash Data review:  
Pertinent labs: CBC, BMP, LFT's PFT: 
 
Date FVC FEV1  FEV1/FVC [id number] TLC RV RV/TLC VC DLCO  
7/6/2018 77 76 72 67 76 74 97 77 64  
           
           
           
 
 
6 min walk test;6 minute walk distance: 306 meters, No significant O2 desaturation, Dyspnea index unchanged Echo:from 2017- EF 55 %, no estimation of right heart pressures Imaging: 
I have personally reviewed the patients radiographs and have reviewed the reports: CXR Results  (Last 48 hours) 08/29/18 1626  XR CHEST PA LAT Final result Impression:  Impression: No acute cardiopulmonary abnormalities. The previously seen left pleural  
effusion has almost completely resolved with minimal residual fluid on the left  
side. Narrative:  CHEST PA AND LATERAL:  
   
CPT CODE: 70498 COMPARISON: 7/6/2018 INDICATION: Pleural effusion FINDINGS: Heart and mediastinum are normal. The aorta is calcified. Mild apical  
pleural thickening is present bilaterally. There is minimal blunting of the left  
costophrenic angle. The previously seen left pleural effusion has decreased  
significantly in size. Extensive degenerative changes are seen involving the  
thoracic spine with evidence of diffuse idiopathic skeletal hyperostosis. CT Results  (Last 48 hours) None Patient Active Problem List  
Diagnosis Code  Dyslipidemia E78.5  Heart murmur R01.1  HTN (hypertension) I10  
 Urinary frequency R35.0  
 Urinary retention R33.9  Type II or unspecified type diabetes mellitus without mention of complication, not stated as uncontrolled E11.9  Diabetes mellitus type 2, controlled ([facility] [address] E11.9  Tobacco abuse Z72.0  Type 2 diabetes mellitus with nephropathy ([facility]) E11.21  
 MISTY (renal artery stenosis) ([facility]) I70.1  COPD (chronic obstructive pulmonary disease) with emphysema ([facility]) J43.9 IMPRESSION:  
· TIRADO: multifactorial with significant component from cardiac ischemia should have been corrected after revascularization but symptomatic decline in absence of proportionate Pulmonary functional abnormalities. Imaging with Pleural effusion which has resolved on follow up CXR today. 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 Mixed mild obstructive and restrictive impairment with DLCO reduced to 64% predicted · Current smoker, candidate for LDCT screening · Persistent leg edema- ? Right heart failure/constrictive pericarditis. Needs new echo and imaging and evaluation for Pulm HTN, VTE, / HENRY- cirrhosis · CAD- s/p RCA stent x3 and now on dual antiplatelet therapy · Renal artery stenosis- s/p stent · KYREE - contrast 
· Fatigue- multifactorial   
  
RECOMMENDATIONS:  
· Will monitor for any recurrence of symptoms · Discussed CXR findings with patient and spouse · Continue cardiopulmonary rehab · LDCT candidate- annual 
· Discussed at length with patient and spouse · Follow up -as needed Health maintenance screens deferred to Primary care provider. Porsche Astorga MD 
 
 
 
 
 
 
If you have questions, please do not hesitate to call me. I look forward to following Mr. Maynard along with you.  
 
 
 
Sincerely, 
 
 
Porsche Astorga MD

## 2018-08-29 NOTE — PROGRESS NOTES
Chief Complaint Patient presents with  COPD  
  follow up from 7/6/2018  Breathing Problem 1. Have you been to the ER, urgent care clinic since your last visit? Hospitalized since your last visit? No 
 
2. Have you seen or consulted any other health care providers outside of the [address] [place] [address] since your last visit? Include any pap smears or colon screening.  Yes Where: Dr. Lucas Lozano, Cardiologist

## 2018-08-30 PROBLEM — J90 PLEURAL EFFUSION: Status: ACTIVE | Noted: 2018-08-30

## 2018-08-30 PROBLEM — R06.09 DOE (DYSPNEA ON EXERTION): Status: ACTIVE | Noted: 2018-08-30

## 2018-08-30 NOTE — COMMUNICATION BODY
[facility] Pulmonary, Critical Care, and Sleep Medicine Pulmonary Office follow up Name: Renetta Quintero : 1945 Date: 2018 Subjective:  
Patient has been referred for evaluation of: SOB 
 
18: 
Feels much better after starting cardiopulmonary rehab. Less SOB. Denies any chest pain No fever/chills Not using any inhalers Denies pedal edema Here to discuss imaging- had CT chest and follow up CXR today. HPI: 
Patient is a 68 y.o. male who has been a life long smoker- currently 10 cig. day . He has noticed SOB for several years which was predominately on exertion - walked 2-3 miles daily and felt winded but noticed acute decompensation after he had a stress test ( nuclear ) with cath and RCA stent. He seems to correlate the initiation of COREG to decline in symptoms. He had a second intervention with 2 more stents . Also more recently and renal artery stenting. He now can not walk more than 1 block. Climbs stairs at home slowly. Denies orthopnea or PND. Has noticed significant persistent swelling of lower extremities and extreme fatigue. Denies any acute preceeding viral/flu like illness. Denies fever/chills. Was recently started on [facility] by Dr. Terry Prado and feels it helps. Reports recent findings of KYREE and proteinuria. SOB:  
Symptoms occur at exertion. Able to walk about 200 Feet. Can climb stairs. Activities of daily living are somewhat affected Denies orthopnea/ paroxysmal nocturnal dyspnea SOB relieved at rest 
 
Cough: very mild- not a complaint Denies Associated wheezing, chest pain, fever, chills, night sweats dyspepsia, reflux. Comorbid conditions include- DM, HTN, KLAUDIA. 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  
Diagnosis Date  Arthritis   
 hip and back  Cardiac echocardiogram 2014 EF 65%. No WMA. Normal fdiastolic fx. Mild AI. No significant chg from study of 9/10/10.  Cardiac nuclear imaging study, normal 04/03/2009 No ischemia or prior infarction. EF 68%. No reg'l WMA.  Chronic lung disease  Diabetes ([facility] [address] 11/2012  
 type II  
 Diabetes mellitus ([facility] [address]  Dyslipidemia  Essential hypertension  Gallstones  GERD (gastroesophageal reflux disease)  Heart murmur  HTN (hypertension)  Irregular heart beat  Kidney disease  Osteopenia Past Surgical History:  
Procedure Laterality Date  HX GI    
 HX HEART CATHETERIZATION  03/2018 2nd 4/2018,   
Laxmi Eshachutz RENAL ARTERY STENT Left 04/2018  HX TONSILLECTOMY  LAP,CHOLECYSTECTOMY  REMOVAL GALLBLADDER No Known Allergies Current Outpatient Prescriptions Medication Sig Dispense Refill  lisinopril (PRINIVIL, ZESTRIL) 10 mg tablet Take  by mouth daily.  psyllium (METAMUCIL) packet Take 1 Packet by mouth daily.  acetaminophen (TYLENOL EXTRA STRENGTH) 500 mg tablet Take  by mouth every six (6) hours as needed for Pain.  diphenhydrAMINE (BENADRYL) 25 mg capsule Take 25 mg by mouth every six (6) hours as needed.  ticagrelor (BRILINTA) 90 mg tablet Take 1 Tab by mouth.  carvedilol (COREG) 12.5 mg tablet Take 1 Tab by mouth.  nitroglycerin (NITROSTAT) 0.4 mg SL tablet 0.4 mg by SubLINGual route every five (5) minutes as needed for Chest Pain. Up to 3 doses.  multivitamin (ONE A DAY) tablet Take 1 Tab by mouth daily.  tamsulosin (FLOMAX) 0.4 mg capsule Take 1 Cap by mouth daily. 90 Cap 3  
 hydrALAZINE (APRESOLINE) 50 mg tablet Take 1 Tab by mouth two (2) times a day. 180 Tab 3  
 rosuvastatin (CRESTOR) 20 mg tablet Take 20 mg by mouth nightly.  amLODIPine (NORVASC) 10 mg tablet TAKE 1 TABLET BY MOUTH DAILY. (Patient taking differently: 5 mg. TAKE 1 TABLET BY MOUTH DAILY. ) 90 Tab 3  
  omeprazole (PRILOSEC) 10 mg capsule Take 5 mg by mouth daily.  Cholecalciferol, Vitamin D3, (VITAMIN D3) 1,000 unit cap Take 400 Units by mouth daily.  aspirin 81 mg tablet Take 81 mg by mouth daily.  umeclidinium-vilanterol (ANORO ELLIPTA) 62.5-25 mcg/actuation inhaler Take  by inhalation.  VITAMIN A PO Take 2,100 Units by mouth daily.  fenofibric acid (TRILIPIX) 135 mg capsule Take 135 mg by mouth nightly. Review of Systems: 
HEENT: No epistaxis, no nasal drainage, no difficulty in swallowing, no redness in eyes Respiratory: as above Cardiovascular: no chest pain, no palpitations, no chronic leg edema, no syncope Gastrointestinal: no abd pain, no vomiting, no diarrhea, no bleeding symptoms Genitourinary: No urinary symptoms or hematuria Integument/breast: No ulcers or rashes Musculoskeletal:Neg 
Neurological: No focal weakness, no seizures, no headaches Behvioral/Psych: No anxiety, no depression Constitutional: No fever, no chills, no weight loss, no night sweats Objective:  
 
Visit Vitals  /54 (BP 1 Location: Left arm, BP Patient Position: Sitting)  Pulse (!) 48  Temp 98.2 °F (36.8 °C) (Oral)  Resp 16  
 Ht 5' 9\" (1.753 m)  Wt 74.4 kg (164 lb)  SpO2 97%  BMI 24.22 kg/m2 Physical Exam:  
General: comfortable, no acute distress HEENT: pupils reactive, sclera anicteric, EOM intact Neck: No adenopathy or thyroid swelling, no lymphadenopathy or JVD, supple CVS: S1S2 no murmurs RS: Mod AE bilaterally, no tactile fremitus or egophony, no accessory muscle use Abd: soft, non tender, no hepatosplenomegaly Neuro: non focal, awake, alert Extrm: no edema, clubbing or cyanosis Skin: no rash Data review:  
Pertinent labs: CBC, BMP, LFT's PFT: 
 
Date FVC FEV1  FEV1/FVC [id number] TLC RV RV/TLC VC DLCO  
7/6/2018 77 76 72 67 76 74 97 77 64 6 min walk test;6 minute walk distance: 306 meters, No significant O2 desaturation, Dyspnea index unchanged Echo:from 2017- EF 55 %, no estimation of right heart pressures Imaging: 
I have personally reviewed the patients radiographs and have reviewed the reports: CXR Results  (Last 48 hours) 08/29/18 1626  XR CHEST PA LAT Final result Impression:  Impression: No acute cardiopulmonary abnormalities. The previously seen left pleural  
effusion has almost completely resolved with minimal residual fluid on the left  
side. Narrative:  CHEST PA AND LATERAL:  
   
CPT CODE: 36048 COMPARISON: 7/6/2018 INDICATION: Pleural effusion FINDINGS: Heart and mediastinum are normal. The aorta is calcified. Mild apical  
pleural thickening is present bilaterally. There is minimal blunting of the left  
costophrenic angle. The previously seen left pleural effusion has decreased  
significantly in size. Extensive degenerative changes are seen involving the  
thoracic spine with evidence of diffuse idiopathic skeletal hyperostosis. CT Results  (Last 48 hours) None Patient Active Problem List  
Diagnosis Code  Dyslipidemia E78.5  Heart murmur R01.1  HTN (hypertension) I10  
 Urinary frequency R35.0  
 Urinary retention R33.9  Type II or unspecified type diabetes mellitus without mention of complication, not stated as uncontrolled E11.9  Diabetes mellitus type 2, controlled ([facility] [address] E11.9  Tobacco abuse Z72.0  Type 2 diabetes mellitus with nephropathy ([facility]) E11.21  
 MISTY (renal artery stenosis) ([facility]) I70.1  COPD (chronic obstructive pulmonary disease) with emphysema ([facility]) J43.9 IMPRESSION:  
· TIRADO: multifactorial with significant component from cardiac ischemia should have been corrected after revascularization but symptomatic decline in absence of proportionate Pulmonary functional abnormalities.  Imaging with Pleural effusion which has resolved on follow up CXR today. 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 Mixed mild obstructive and restrictive impairment with DLCO reduced to 64% predicted · Current smoker, candidate for LDCT screening · Persistent leg edema- ? Right heart failure/constrictive pericarditis. Needs new echo and imaging and evaluation for Pulm HTN, VTE, / HENRY- cirrhosis · CAD- s/p RCA stent x3 and now on dual antiplatelet therapy · Renal artery stenosis- s/p stent · KYREE - contrast 
· Fatigue- multifactorial   
  
RECOMMENDATIONS:  
· Will monitor for any recurrence of symptoms · Discussed CXR findings with patient and spouse · Continue cardiopulmonary rehab · LDCT candidate- annual 
· Discussed at length with patient and spouse · Follow up -as needed Health maintenance screens deferred to Primary care provider.  
  
Cayetano Lilly MD

## 2018-11-19 RX ORDER — HYDRALAZINE HYDROCHLORIDE 50 MG/1
TABLET, FILM COATED ORAL
Qty: 180 TAB | Refills: 2 | Status: SHIPPED | OUTPATIENT
Start: 2018-11-19

## 2018-12-14 DIAGNOSIS — R35.1 NOCTURIA: ICD-10-CM

## 2018-12-14 DIAGNOSIS — R35.0 FREQUENT URINATION: ICD-10-CM

## 2018-12-14 RX ORDER — TAMSULOSIN HYDROCHLORIDE 0.4 MG/1
CAPSULE ORAL
Qty: 86 CAP | Refills: 2 | Status: SHIPPED | OUTPATIENT
Start: 2018-12-14 | End: 2019-08-12 | Stop reason: SDUPTHER

## 2019-01-11 ENCOUNTER — OFFICE VISIT (OUTPATIENT)
Dept: UROLOGY | Age: 74
End: 2019-01-11

## 2019-01-11 VITALS
HEART RATE: 51 BPM | DIASTOLIC BLOOD PRESSURE: 50 MMHG | OXYGEN SATURATION: 98 % | WEIGHT: 157 LBS | BODY MASS INDEX: 23.25 KG/M2 | HEIGHT: 69 IN | SYSTOLIC BLOOD PRESSURE: 131 MMHG

## 2019-01-11 DIAGNOSIS — R35.0 BENIGN PROSTATIC HYPERPLASIA WITH URINARY FREQUENCY: Primary | ICD-10-CM

## 2019-01-11 DIAGNOSIS — N40.1 BENIGN PROSTATIC HYPERPLASIA WITH URINARY FREQUENCY: Primary | ICD-10-CM

## 2019-01-11 LAB
BILIRUB UR QL STRIP: NEGATIVE
GLUCOSE UR-MCNC: NEGATIVE MG/DL
KETONES P FAST UR STRIP-MCNC: NEGATIVE MG/DL
PH UR STRIP: 5 [PH] (ref 4.6–8)
PROT UR QL STRIP: NORMAL
SP GR UR STRIP: 1.02 (ref 1–1.03)
UA UROBILINOGEN AMB POC: NORMAL (ref 0.2–1)
URINALYSIS CLARITY POC: CLEAR
URINALYSIS COLOR POC: YELLOW
URINE BLOOD POC: NEGATIVE
URINE LEUKOCYTES POC: NEGATIVE
URINE NITRITES POC: NEGATIVE

## 2019-01-11 RX ORDER — FUROSEMIDE 20 MG/1
20 TABLET ORAL
COMMUNITY
Start: 2019-01-03

## 2019-01-11 NOTE — PROGRESS NOTES
Mr. Cait Krishnamurthy has a reminder for a \"due or due soon\" health maintenance. I have asked that he contact his primary care provider for follow-up on this health maintenance. RBV Per Dr. Walker Samples draw lab today for PSA for BPH.

## 2019-01-11 NOTE — PROGRESS NOTES
Chief Complaint Patient presents with  Benign Prostatic Hypertrophy HISTORY OF PRESENT ILLNESS:  Malik Franklin is a 68 y.o. male who presents today for his annual STEVAN and PSA. As far as his prostate is concerned, he is doing very well on the Flomax. He has had nocturia 1-2 times but seems to be doing very well. However he is post void residual today is a little less than 120 cc. This past year has been a little rough for him medically and that he has had cardiac stents placed and is still recovering from a good bit of that. ROS documented on the chart Past Medical History:  
Diagnosis Date  Arthritis   
 hip and back  Cardiac echocardiogram 06/24/2014 EF 65%. No WMA. Normal fdiastolic fx. Mild AI. No significant chg from study of 9/10/10.  Cardiac nuclear imaging study, normal 04/03/2009 No ischemia or prior infarction. EF 68%. No reg'l WMA.  Chronic lung disease  Diabetes ([facility] [address] 11/2012  
 type II  
 Diabetes mellitus ([facility] [address]  Dyslipidemia  Essential hypertension  Gallstones  GERD (gastroesophageal reflux disease)  Heart murmur  HTN (hypertension)  Irregular heart beat  Kidney disease  Osteopenia Past Surgical History:  
Procedure Laterality Date  HX GI    
 HX HEART CATHETERIZATION  03/2018 2nd 4/2018,   
Jose Juan Deep RENAL ARTERY STENT Left 04/2018  HX TONSILLECTOMY  LAP,CHOLECYSTECTOMY  REMOVAL GALLBLADDER Social History Tobacco Use  Smoking status: Current Every Day Smoker Packs/day: 0.25 Years: 50.00 Pack years: 12.50 Types: Cigarettes  Smokeless tobacco: Never Used Substance Use Topics  Alcohol use: No  
  Comment: occasionally  Drug use: No  
 
 
No Known Allergies Family History Problem Relation Age of Onset  Heart Disease Mother   
     hx of valve surgery  Cancer Father Current Outpatient Medications Medication Sig Dispense Refill  furosemide (LASIX) 20 mg tablet 20 mg.    
 tamsulosin (FLOMAX) 0.4 mg capsule TAKE ONE CAPSULE BY MOUTH DAILY 86 Cap 2  
 hydrALAZINE (APRESOLINE) 50 mg tablet TAKE ONE TABLET BY MOUTH TWICE A  Tab 2  
 lisinopril (PRINIVIL, ZESTRIL) 10 mg tablet Take  by mouth daily.  acetaminophen (TYLENOL EXTRA STRENGTH) 500 mg tablet Take  by mouth every six (6) hours as needed for Pain.  diphenhydrAMINE (BENADRYL) 25 mg capsule Take 25 mg by mouth every six (6) hours as needed.  ticagrelor (BRILINTA) 90 mg tablet Take 1 Tab by mouth.  carvedilol (COREG) 12.5 mg tablet Take 6.25 mg by mouth.  multivitamin (ONE A DAY) tablet Take 1 Tab by mouth daily.  rosuvastatin (CRESTOR) 20 mg tablet Take 20 mg by mouth nightly.  amLODIPine (NORVASC) 10 mg tablet TAKE 1 TABLET BY MOUTH DAILY. (Patient taking differently: 5 mg. TAKE 1 TABLET BY MOUTH DAILY. ) 90 Tab 3  
 omeprazole (PRILOSEC) 10 mg capsule Take 5 mg by mouth daily.  Cholecalciferol, Vitamin D3, (VITAMIN D3) 1,000 unit cap Take 400 Units by mouth daily.  aspirin 81 mg tablet Take 81 mg by mouth daily.  psyllium (METAMUCIL) packet Take 1 Packet by mouth daily.  umeclidinium-vilanterol (ANORO ELLIPTA) 62.5-25 mcg/actuation inhaler Take  by inhalation.  nitroglycerin (NITROSTAT) 0.4 mg SL tablet 0.4 mg by SubLINGual route every five (5) minutes as needed for Chest Pain. Up to 3 doses.  VITAMIN A PO Take 2,100 Units by mouth daily.  fenofibric acid (TRILIPIX) 135 mg capsule Take 135 mg by mouth nightly. PHYSICAL EXAMINATION:  
Visit Vitals /50 (BP 1 Location: Left arm, BP Patient Position: Sitting) Pulse (!) 51 Comment: reported to dr Iesha Bliss Ht 5' 9\" (1.753 m) Wt 157 lb (71.2 kg) SpO2 98% BMI 23.18 kg/m² Constitutional: WDWN, Pleasant and appropriate affect, No acute distress. CV:  No peripheral swelling noted Respiratory: No respiratory distress or difficulties Abdomen:  No abdominal masses or tenderness. No CVA tenderness. No inguinal hernias noted.  Male: STEVAN:Perineum normal to visual inspection, no erythema or irritation, normal sphincter tone with no rectal lesions. Prostate remains about 40 g in size and there is a hint of a nodule. I am not really sure this is a distinct nodule but I do not recall it on the previous examination. SCROTUM:  No scrotal rash or lesions noticed. Normal bilateral testes and epididymis. PENIS: Urethral meatus normal in location and size. No urethral discharge. Skin: No jaundice. Neuro/Psych:  Alert and oriented x 3, affect appropriate. Lymphatic:   No enlarged inguinal lymph nodes. Results for orders placed or performed in visit on 01/11/19 AMB POC URINALYSIS DIP STICK AUTO W/O MICRO Result Value Ref Range Color (UA POC) Yellow Clarity (UA POC) Clear Glucose (UA POC) Negative Negative Bilirubin (UA POC) Negative Negative Ketones (UA POC) Negative Negative Specific gravity (UA POC) 1.020 1.001 - 1.035 Blood (UA POC) Negative Negative pH (UA POC) 5.0 4.6 - 8.0 Protein (UA POC) 2+ Negative Urobilinogen (UA POC) 0.2 mg/dL 0.2 - 1 Nitrites (UA POC) Negative Negative Leukocyte esterase (UA POC) Negative Negative Bladder scan shows 120 cc postvoid residual. 
 
REVIEW OF LABS AND IMAGING:  
 
Imaging Report Reviewed? Yes 
Images Reviewed? YES Other Lab Data Reviewed? YES 
 
2010 PSA 2.0 January 2018 PSA 2.5 ASSESSMENT:  
  ICD-10-CM ICD-9-CM 1. Benign prostatic hyperplasia with urinary frequency N40.1 600.01 AMB POC URINALYSIS DIP STICK AUTO W/O MICRO  
 R35.0 788.41 COLLECTION VENOUS BLOOD,VENIPUNCTURE  
   PSA, DIAGNOSTIC (PROSTATE SPECIFIC AG) PLAN / DISCUSSION: : He has moderate outlet obstructive symptoms and although he is still doing fairly well on the Flomax I think he is reaching some point where we may need to add finasteride. He also has a vague area of a prostate nodule on the left. His PSA is repeated today and I would probably like for him to come back in about 6 months just to repeat the STEVAN. The patient expresses understanding and agreement of the discussion and plan. Shirley Chandler MD on 1/11/2019 Please note: This document has been produced using voice recognition software. Unrecognized errors in transcription may be present.

## 2019-01-11 NOTE — PATIENT INSTRUCTIONS
Prostate Cancer Screening: Care Instructions Your Care Instructions The prostate gland is an organ found just below a man's bladder. It is the size and shape of a walnut. It surrounds the tube that carries urine from the bladder out of the body through the penis. This tube is called the urethra. Prostate cancer is the abnormal growth of cells in the prostate. It is the second most common type of cancer in men. (Skin cancer is the most common.) Most cases of prostate cancer occur in men older than 72. The disease runs in families. And it's more common in -American men. When it's found and treated early, prostate cancer may be cured. But it is not always treated. This is because prostate cancer may not shorten your life, especially if you are older and the cancer is growing slowly. Follow-up care is a key part of your treatment and safety. Be sure to make and go to all appointments, and call your doctor if you are having problems. It's also a good idea to know your test results and keep a list of the medicines you take. What are the screening tests for prostate cancer? The main screening test for prostate cancer is the prostate-specific antigen (PSA) test. This is a blood test that measures how much PSA is in your blood. A high level may mean that you have an enlargement, an infection, or cancer. Along with the PSA test, you may have a digital rectal exam. The digital (finger) rectal exam checks for anything abnormal in your prostate. To do the exam, the doctor puts a lubricated, gloved finger into your rectum. If these tests suggest cancer, you may need a prostate biopsy. How is prostate cancer diagnosed? In a biopsy, the doctor takes small tissue samples from your prostate gland. Another doctor then looks at the tissue under a microscope to see if there are cancer cells, signs of infection, or other problems. The results help diagnose prostate cancer. What are the pros and cons of screening? Neither a PSA test nor a digital rectal exam can tell you for sure that you do or do not have cancer. But they can help you decide if you need more tests, such as a prostate biopsy. Screening tests may be useful because most men with prostate cancer don't have symptoms. It can be hard to know if you have cancer until it is more advanced. And then it's harder to treat. But having a PSA test can also cause harm. The test may show high levels of PSA that aren't caused by cancer. So you could have a prostate biopsy you didn't need. Or the PSA test might be normal when there is cancer, so a cancer might not be found early. The test can also find cancers that would never have caused a problem during your lifetime. So you might have treatment that was not needed. Prostate cancer usually develops late in life and grows slowly. For many men, it does not shorten their lives. Some experts advise screening only for men who are at high risk. Talk with your doctor to see if screening is right for you. Where can you learn more? Go to http://spike-rosa isela.info/. Enter R550 in the search box to learn more about \"Prostate Cancer Screening: Care Instructions. \" Current as of: March 28, 2018 Content Version: 11.8 © 8682-3351 [employer]. Care instructions adapted under license by [employer] (which disclaims liability or warranty for this information). If you have questions about a medical condition or this instruction, always ask your healthcare professional. [place] [address] any warranty or liability for your use of this information.

## 2019-01-12 LAB — PSA SERPL-MCNC: 3 NG/ML (ref 0–4)

## 2019-02-13 ENCOUNTER — OFFICE VISIT (OUTPATIENT)
Dept: ORTHOPEDIC SURGERY | Facility: CLINIC | Age: 74
End: 2019-02-13

## 2019-02-13 VITALS
DIASTOLIC BLOOD PRESSURE: 42 MMHG | SYSTOLIC BLOOD PRESSURE: 131 MMHG | HEIGHT: 69 IN | RESPIRATION RATE: 16 BRPM | WEIGHT: 159 LBS | TEMPERATURE: 97.7 F | HEART RATE: 50 BPM | OXYGEN SATURATION: 98 % | BODY MASS INDEX: 23.55 KG/M2

## 2019-02-13 DIAGNOSIS — G89.29 CHRONIC RIGHT-SIDED LOW BACK PAIN, WITH SCIATICA PRESENCE UNSPECIFIED: ICD-10-CM

## 2019-02-13 DIAGNOSIS — R20.0 NUMBNESS IN BOTH LEGS: ICD-10-CM

## 2019-02-13 DIAGNOSIS — M25.551 RIGHT HIP PAIN: ICD-10-CM

## 2019-02-13 DIAGNOSIS — M54.16 LUMBAR RADICULOPATHY: Primary | ICD-10-CM

## 2019-02-13 DIAGNOSIS — M54.5 CHRONIC RIGHT-SIDED LOW BACK PAIN, WITH SCIATICA PRESENCE UNSPECIFIED: ICD-10-CM

## 2019-02-13 DIAGNOSIS — M70.61 TROCHANTERIC BURSITIS OF RIGHT HIP: ICD-10-CM

## 2019-02-13 RX ORDER — HYDROCODONE BITARTRATE AND ACETAMINOPHEN 5; 325 MG/1; MG/1
1 TABLET ORAL
Qty: 21 TAB | Refills: 0 | Status: SHIPPED | OUTPATIENT
Start: 2019-02-13 | End: 2020-03-19

## 2019-02-13 NOTE — PROGRESS NOTES
Patient: Roselia Coronado                MRN: [id number]       SSN: [id number] YOB: 1945        AGE: 68 y.o. SEX: male Body mass index is 23.48 kg/m². PCP: Adolfo Hernández MD 
02/13/19 HISTORY:  Mr. Kaelyn Wiley presents with radicular pain starting in the back going all the way down the leg to the foot. It is worse if he stands for a long period of time and has to go sit down. He was doing Silver Sneakers. Now, he is in the pool. The pain is moderate and aching. He has no bowel or bladder problems. He is a known smoker. He has COPD, diabetes, and stents in the heart. He denies myocardial infarction or stroke. The pain is moderate. It has been slowly progressive. There was a period of time where it was hurting to roll over on the hip at night. He denies any groin pain. He denies fevers, chills, night sweats, or weight loss. PHYSICAL EXAMINATION:  On examination today, he actually prefers to stand than sit. His low back is somewhat uncomfortable for him around L4-5. The hips rotate well including internal rotation and flexion. There is just mild tenderness over the greater trochanter but not severely so. He has distinct numbness involving L5-S1 on the right side compared to the left and ankle dorsiflexion is just slightly decreased on the right than on the left. Straight leg raise is positive with some radiculopathy extending past the level of the knee. There is no cyanosis or clubbing. There is just minimal peripheral edema, and the skin looks normal.  There are no open ulcerations. RADIOGRAPHS:  X-rays show that he has just mild arthritis of the hips and significant arthritis of the lumbar spine, as noted, however. PLAN:  At this point, I would recommend an MRI of the lumbar spine. He has some features of bursitis. I think he has a spondylosis as well at L4-5.   He has already tried an exercise program.  We will see him back after the MRI. He has requested Norco 5 mg, and 21 pills were issued today. REVIEW OF SYSTEMS:   
 
CON: negative for weight loss, fever EYE: negative for double vision ENT: negative for hoarseness RS:   negative for Tb 
GI:    negative for blood in stool :  negative for blood in urine Other systems reviewed and noted below. Past Medical History:  
Diagnosis Date  Arthritis   
 hip and back  Cardiac echocardiogram 06/24/2014 EF 65%. No WMA. Normal fdiastolic fx. Mild AI. No significant chg from study of 9/10/10.  Cardiac nuclear imaging study, normal 04/03/2009 No ischemia or prior infarction. EF 68%. No reg'l WMA.  Chronic lung disease  Diabetes ([facility] [address] 11/2012  
 type II  
 Diabetes mellitus ([facility] [address]  Dyslipidemia  Essential hypertension  Gallstones  GERD (gastroesophageal reflux disease)  Heart murmur  HTN (hypertension)  Irregular heart beat  Kidney disease  Osteopenia Family History Problem Relation Age of Onset  Heart Disease Mother   
     hx of valve surgery  Cancer Father Current Outpatient Medications Medication Sig Dispense Refill  
 HYDROcodone-acetaminophen (NORCO) 5-325 mg per tablet Take 1 Tab by mouth every eight (8) hours as needed for Pain. Max Daily Amount: 3 Tabs. 21 Tab 0  
 furosemide (LASIX) 20 mg tablet 20 mg.    
 tamsulosin (FLOMAX) 0.4 mg capsule TAKE ONE CAPSULE BY MOUTH DAILY 86 Cap 2  
 hydrALAZINE (APRESOLINE) 50 mg tablet TAKE ONE TABLET BY MOUTH TWICE A  Tab 2  
 lisinopril (PRINIVIL, ZESTRIL) 10 mg tablet Take  by mouth daily.  acetaminophen (TYLENOL EXTRA STRENGTH) 500 mg tablet Take  by mouth every six (6) hours as needed for Pain.  diphenhydrAMINE (BENADRYL) 25 mg capsule Take 25 mg by mouth every six (6) hours as needed.  ticagrelor (BRILINTA) 90 mg tablet Take 1 Tab by mouth.  carvedilol (COREG) 12.5 mg tablet Take 6.25 mg by mouth.  nitroglycerin (NITROSTAT) 0.4 mg SL tablet 0.4 mg by SubLINGual route every five (5) minutes as needed for Chest Pain. Up to 3 doses.  multivitamin (ONE A DAY) tablet Take 1 Tab by mouth daily.  rosuvastatin (CRESTOR) 20 mg tablet Take 20 mg by mouth nightly.  amLODIPine (NORVASC) 10 mg tablet TAKE 1 TABLET BY MOUTH DAILY. (Patient taking differently: 5 mg. TAKE 1 TABLET BY MOUTH DAILY. ) 90 Tab 3  
 omeprazole (PRILOSEC) 10 mg capsule Take 5 mg by mouth daily.  Cholecalciferol, Vitamin D3, (VITAMIN D3) 1,000 unit cap Take 400 Units by mouth daily.  aspirin 81 mg tablet Take 81 mg by mouth daily.  psyllium (METAMUCIL) packet Take 1 Packet by mouth daily.  umeclidinium-vilanterol (ANORO ELLIPTA) 62.5-25 mcg/actuation inhaler Take  by inhalation.  VITAMIN A PO Take 2,100 Units by mouth daily.  fenofibric acid (TRILIPIX) 135 mg capsule Take 135 mg by mouth nightly. No Known Allergies Past Surgical History:  
Procedure Laterality Date  HX GI    
 HX HEART CATHETERIZATION  03/2018 2nd 4/2018,   
Sherri Shanks RENAL ARTERY STENT Left 04/2018  HX TONSILLECTOMY  LAP,CHOLECYSTECTOMY  REMOVAL GALLBLADDER Social History Socioeconomic History  Marital status:  Spouse name: Not on file  Number of children: Not on file  Years of education: Not on file  Highest education level: Not on file Social Needs  Financial resource strain: Not on file  Food insecurity - worry: Not on file  Food insecurity - inability: Not on file  Transportation needs - medical: Not on file  Transportation needs - non-medical: Not on file Occupational History  Not on file Tobacco Use  Smoking status: Current Every Day Smoker Packs/day: 0.25 Years: 50.00 Pack years: 12.50 Types: Cigarettes  Smokeless tobacco: Never Used Substance and Sexual Activity  Alcohol use: No  
  Comment: occasionally  Drug use: No  
 Sexual activity: No  
Other Topics Concern  Not on file Social History Narrative  Not on file Visit Vitals /42 (BP 1 Location: Left arm, BP Patient Position: Sitting) Pulse (!) 50 Temp 97.7 °F (36.5 °C) (Oral) Resp 16 Ht 5' 9\" (1.753 m) Wt 159 lb (72.1 kg) SpO2 98% BMI 23.48 kg/m² PHYSICAL EXAMINATION: 
GENERAL: Alert and oriented x3, in no acute distress, well-developed, well-nourished, afebrile. HEART: No JVD. EYES: No scleral icterus NECK: No significant lymphadenopathy LUNGS: No respiratory compromise or indrawing ABDOMEN: Soft, non-tender, non-distended. Electronically signed by:  Torie Weber MD

## 2019-02-22 ENCOUNTER — HOSPITAL ENCOUNTER (OUTPATIENT)
Age: 74
Discharge: HOME | End: 2019-02-22
Attending: ORTHOPAEDIC SURGERY
Payer: MEDICARE

## 2019-02-22 DIAGNOSIS — M54.16 LUMBAR RADICULOPATHY: ICD-10-CM

## 2019-02-22 DIAGNOSIS — M54.5 CHRONIC RIGHT-SIDED LOW BACK PAIN, WITH SCIATICA PRESENCE UNSPECIFIED: ICD-10-CM

## 2019-02-22 DIAGNOSIS — G89.29 CHRONIC RIGHT-SIDED LOW BACK PAIN, WITH SCIATICA PRESENCE UNSPECIFIED: ICD-10-CM

## 2019-02-22 PROCEDURE — 72148 MRI LUMBAR SPINE W/O DYE: CPT

## 2019-02-28 ENCOUNTER — OFFICE VISIT (OUTPATIENT)
Dept: ORTHOPEDIC SURGERY | Age: 74
End: 2019-02-28

## 2019-02-28 VITALS
WEIGHT: 152 LBS | SYSTOLIC BLOOD PRESSURE: 159 MMHG | OXYGEN SATURATION: 100 % | TEMPERATURE: 97.9 F | DIASTOLIC BLOOD PRESSURE: 52 MMHG | HEIGHT: 69 IN | HEART RATE: 53 BPM | BODY MASS INDEX: 22.51 KG/M2 | RESPIRATION RATE: 17 BRPM

## 2019-02-28 DIAGNOSIS — M54.16 LUMBAR RADICULOPATHY: Primary | ICD-10-CM

## 2019-02-28 RX ORDER — METHYLPREDNISOLONE 4 MG/1
TABLET ORAL
Qty: 1 DOSE PACK | Refills: 0 | Status: SHIPPED | OUTPATIENT
Start: 2019-02-28 | End: 2019-04-02 | Stop reason: ALTCHOICE

## 2019-02-28 RX ORDER — CYCLOBENZAPRINE HCL 10 MG
10 TABLET ORAL
Qty: 30 TAB | Refills: 2 | Status: SHIPPED | OUTPATIENT
Start: 2019-02-28

## 2019-02-28 RX ORDER — HYDROCODONE BITARTRATE AND ACETAMINOPHEN 7.5; 325 MG/1; MG/1
1 TABLET ORAL
Qty: 28 TAB | Refills: 0 | Status: SHIPPED | OUTPATIENT
Start: 2019-02-28 | End: 2019-03-07

## 2019-02-28 NOTE — PROGRESS NOTES
[address] [place] [address], [address] [place] [address] 
[phone] Patient: Gabriel Cross                MRN: [id number]       SSN: [id number] YOB: 1945        AGE: 68 y.o. SEX: male Body mass index is 22.45 kg/m². PCP: Kiran Dang MD 
02/28/19 This office note has been dictated. REVIEW OF SYSTEMS: 
Constitutional: Negative for fever, chills, weight loss and malaise/fatigue. HENT: Negative. Eyes: Negative. Respiratory: Negative. Cardiovascular: Negative. Gastrointestinal: No bowel incontinence or constipation. Genitourinary: No bladder incontinence or saddle anesthesia. Skin: Negative. Neurological: Negative. Endo/Heme/Allergies: Negative. Psychiatric/Behavioral: Negative. Musculoskeletal: As per HPI above. Past Medical History:  
Diagnosis Date  Arthritis   
 hip and back  Cardiac echocardiogram 06/24/2014 EF 65%. No WMA. Normal fdiastolic fx. Mild AI. No significant chg from study of 9/10/10.  Cardiac nuclear imaging study, normal 04/03/2009 No ischemia or prior infarction. EF 68%. No reg'l WMA.  Chronic lung disease  Diabetes ([facility] [address] 11/2012  
 type II  
 Diabetes mellitus ([facility] [address] [facility]  Dyslipidemia  Essential hypertension  Gallstones  GERD (gastroesophageal reflux disease)  Heart murmur  HTN (hypertension)  Irregular heart beat  Kidney disease  Osteopenia Current Outpatient Medications:  
  furosemide (LASIX) 20 mg tablet, 20 mg., Disp: , Rfl:  
  tamsulosin (FLOMAX) 0.4 mg capsule, TAKE ONE CAPSULE BY MOUTH DAILY, Disp: 86 Cap, Rfl: 2 
  hydrALAZINE (APRESOLINE) 50 mg tablet, TAKE ONE TABLET BY MOUTH TWICE A DAY, Disp: 180 Tab, Rfl: 2 
  lisinopril (PRINIVIL, ZESTRIL) 10 mg tablet, Take  by mouth daily. , Disp: , Rfl:  
  psyllium (METAMUCIL) packet, Take 1 Packet by mouth daily. , Disp: , Rfl:  
   acetaminophen (TYLENOL EXTRA STRENGTH) 500 mg tablet, Take  by mouth every six (6) hours as needed for Pain., Disp: , Rfl:  
  diphenhydrAMINE (BENADRYL) 25 mg capsule, Take 25 mg by mouth every six (6) hours as needed. , Disp: , Rfl:  
  ticagrelor (BRILINTA) 90 mg tablet, Take 1 Tab by mouth., Disp: , Rfl:  
  carvedilol (COREG) 12.5 mg tablet, Take 6.25 mg by mouth., Disp: , Rfl:  
  nitroglycerin (NITROSTAT) 0.4 mg SL tablet, 0.4 mg by SubLINGual route every five (5) minutes as needed for Chest Pain. Up to 3 doses. , Disp: , Rfl:  
  multivitamin (ONE A DAY) tablet, Take 1 Tab by mouth daily. , Disp: , Rfl:  
  rosuvastatin (CRESTOR) 20 mg tablet, Take 20 mg by mouth nightly., Disp: , Rfl:  
  amLODIPine (NORVASC) 10 mg tablet, TAKE 1 TABLET BY MOUTH DAILY. (Patient taking differently: 5 mg. TAKE 1 TABLET BY MOUTH DAILY.), Disp: 90 Tab, Rfl: 3 
  omeprazole (PRILOSEC) 10 mg capsule, Take 5 mg by mouth daily. , Disp: , Rfl:  
  Cholecalciferol, Vitamin D3, (VITAMIN D3) 1,000 unit cap, Take 400 Units by mouth daily. , Disp: , Rfl:  
  aspirin 81 mg tablet, Take 81 mg by mouth daily. , Disp: , Rfl:  
  HYDROcodone-acetaminophen (NORCO) 5-325 mg per tablet, Take 1 Tab by mouth every eight (8) hours as needed for Pain. Max Daily Amount: 3 Tabs., Disp: 21 Tab, Rfl: 0 
  umeclidinium-vilanterol (ANORO ELLIPTA) 62.5-25 mcg/actuation inhaler, Take  by inhalation. , Disp: , Rfl:  
  VITAMIN A PO, Take 2,100 Units by mouth daily. , Disp: , Rfl:  
  fenofibric acid (TRILIPIX) 135 mg capsule, Take 135 mg by mouth nightly., Disp: , Rfl:  
 
No Known Allergies Social History Socioeconomic History  Marital status:  Spouse name: Not on file  Number of children: Not on file  Years of education: Not on file  Highest education level: Not on file Social Needs  Financial resource strain: Not on file  Food insecurity - worry: Not on file  Food insecurity - inability: Not on file  Transportation needs - medical: Not on file  Transportation needs - non-medical: Not on file Occupational History  Not on file Tobacco Use  Smoking status: Current Every Day Smoker Packs/day: 0.25 Years: 50.00 Pack years: 12.50 Types: Cigarettes  Smokeless tobacco: Never Used Substance and Sexual Activity  Alcohol use: No  
  Comment: occasionally  Drug use: No  
 Sexual activity: No  
Other Topics Concern  Not on file Social History Narrative  Not on file Past Surgical History:  
Procedure Laterality Date  HX GI    
 HX HEART CATHETERIZATION  03/2018 2nd 4/2018,   
Seleta Cross RENAL ARTERY STENT Left 04/2018  HX TONSILLECTOMY  LAP,CHOLECYSTECTOMY  REMOVAL GALLBLADDER We did see Mr. Maynard for followup with regards to his low back and right lower extremity radiculopathy. The patient was seen by Dr. Celso Alfred and sent for an MRI of his back. He presents today for reevaluation. The patient does continue to have some low back pain and right buttocks pain, as well as right lower extremity discomfort to the outside part of his lower leg. He has had no change in his bowel or bladder habits and no loss of control. He does have increased discomfort with coughing and sneezing. He has been unable to take anti-inflammatory medications. He has kidney disease. He is currently on a blood thinner too for stents. PHYSICAL EXAMINATION:  In general, the patient is alert and oriented x 3 in no acute distress. The patient is well-developed, well-nourished, with a normal affect. The patient is afebrile. HEENT:  Head is normocephalic and atraumatic. Pupils are equally round and reactive to light and accommodation. Extraocular eye movements are intact. Neck is supple. Trachea is midline. No JVD is present. Breathing is nonlabored. Examination of the back reveals the skin is intact.   There is no ecchymosis, no warmth, and no signs of infection or cellulitis present. There is no pain with rotation of the hip. He does have some slight discomfort to palpation of the greater trochanteric bursae. He does have discomfort with straight leg raise of the right lower extremity. There is decreased sensation to L4-5 and L5-S1 right lower extremity. RADIOGRAPHS:  Review of the MRI shows L4-5 right central disc protrusion, contact impingement of the right L5 nerve root at the lateral recess and associated mild to moderate narrowing of the right L4-5 foramen. ASSESSMENT:   
 
1. Lumbar radiculopathy. 2. L4-5 disc bulge. PLAN:  At this point, we are going to get him into physical therapy for a low back program.  I will start him on a Medrol Dosepak, Flexeril, and Norco.  A one-week supply was given. We will refer him over to The [address] [place] [address] for further evaluation and treatment. We will have him call with any questions or concerns that shall arise.    
 
 
 
 
 
 
JR Melchor MADRIGAL, PA-C, ATC

## 2019-02-28 NOTE — PROGRESS NOTES
1. Have you been to the ER, urgent care clinic since your last visit? Hospitalized since your last visit? No 
 
2. Have you seen or consulted any other health care providers outside of the [address] [place] [address] since your last visit? Include any pap smears or colon screening.  No

## 2019-03-04 ENCOUNTER — HOSPITAL ENCOUNTER (OUTPATIENT)
Dept: PHYSICAL THERAPY | Age: 74
Discharge: HOME | End: 2019-03-04
Payer: MEDICARE

## 2019-03-04 PROCEDURE — 97530 THERAPEUTIC ACTIVITIES: CPT | Performed by: PHYSICAL THERAPIST

## 2019-03-04 PROCEDURE — 97140 MANUAL THERAPY 1/> REGIONS: CPT | Performed by: PHYSICAL THERAPIST

## 2019-03-04 PROCEDURE — 97110 THERAPEUTIC EXERCISES: CPT | Performed by: PHYSICAL THERAPIST

## 2019-03-04 PROCEDURE — 97161 PT EVAL LOW COMPLEX 20 MIN: CPT | Performed by: PHYSICAL THERAPIST

## 2019-03-04 NOTE — PROGRESS NOTES
In [address] [place] [address], [address] [place], [address] [place], [place] [address]y [address] 
[phone] fax Plan of Care/ Statement of Necessity for Physical Therapy Services Patient name: Jorge [facility] of Care: 3/4/2019 Referral source: Wilfred Acuña : 1945 Medical Diagnosis: Low back pain [M54.5] Radiculopathy, lumbar region [M54.16] Payor: Missy Au / Plan: [facility] [employer] MEDICARE CHOICE PPO/PFFS / Product Type: Managed Care Medicare /  Onset Date:2019 Treatment Diagnosis: Low back pain, joint stiffness, muscle weakness, gait abnormalities Prior Hospitalization: see medical history Provider#: [id number] Medications: Verified on Patient summary List  
 Comorbidities: Cardiac stent x3, Renal Stent 2018. Two of these stents went through femoral artery, HTN medications Prior Level of Function: As of 2018 was able to bend over and tie shoes without sitting down without pain; was going to the  5-6 days per week doing the highest level of the [employer] program as well as \"[employer]\" and intermittently walking 30-45 min. The Plan of Care and following information is based on the information from the initial evaluation. Assessment/ key information: Patient is a pleasant, active older adult with sub-acute onset of R flank pain with radicular symptoms. Objective findings are positive for L4-L5 nerve root irritation with extension preference.  Treatment will focus on Marisol Extension progression with progressive return to previous active lifestyle along with back strengthening exercises for long-term gains and injury prevention.  
  
Patient will continue to benefit from skilled PT services to modify and progress therapeutic interventions, address functional mobility deficits, address ROM deficits, address strength deficits, analyze and address soft tissue restrictions, analyze and cue movement patterns, analyze and modify body mechanics/ergonomics, assess and modify postural abnormalities and address imbalance/dizziness to attain remaining goals. Evaluation Complexity History LOW Complexity : Zero comorbidities / personal factors that will impact the outcome / POC; Examination LOW Complexity : 1-2 Standardized tests and measures addressing body structure, function, activity limitation and / or participation in recreation  ;Presentation LOW Complexity : Stable, uncomplicated  ;Clinical Decision Making MEDIUM Complexity : FOTO score of 26-74 Overall Complexity Rating: LOW Problem List: pain affecting function, decrease ROM, decrease strength, edema affecting function, impaired gait/ balance, decrease ADL/ functional abilitiies, decrease activity tolerance, decrease flexibility/ joint mobility and decrease transfer abilities Treatment Plan may include any combination of the following: Therapeutic exercise, Therapeutic activities, Neuromuscular re-education, Gait/balance training, Manual therapy, Patient education, Functional mobility training and Stair training Patient / Family readiness to learn indicated by: asking questions, trying to perform skills and interest 
Persons(s) to be included in education: patient (P) Barriers to Learning/Limitations: None Patient Goal (s): I would like to be able to put my socks on by myself without sitting down Patient Self Reported Health Status: Good Rehabilitation Potential: good Short Term Goals: To be accomplished in 6 treatments: 1. Patient will decrease pain during aggravating activities by 2 points on Verbal Analog Scale (Eval: 8) to increase participation on Activities of Daily Living such as bending and stooping. 2. Patient will demonstrate increased strength by ½ grade on MMT (Eval: 3/5) throughout core and lumbar spine to improve functional participation in such tasks as prolonged standing and sitting. 3.   Patient will improve sitting tolerance time by 10 min (Eval: 10 min) without increased pain. Long Term Goals: To be accomplished in 12 treatments: 1. Patient will decrease pain during aggravating activities by 4 points on Verbal Analog Scale (Eval: 8/10) to increase participation on Activities of Daily Living such as squatting, lifting and carrying moderate to heavy items. 2. Patient will demonstrate increased strength by 1 grade on MMT (Eval: 3/5) to improve functional participation in such tasks as standing and sitting for greater than 30 min without pain (Eval 10 min). 3.   Patient will achieve predicted FOTO scores to demonstrate decreased functional impairment to facilitate improved participation in activities of daily living, improve overall quality of life. Frequency / Duration: Patient to be seen 2 times per week for 12 treatments. Patient/ Caregiver education and instruction: Diagnosis, prognosis, activity modification, exercises and other Home exercise program 
 [x]  Plan of care has been reviewed with STEPHEN White, PT 3/4/2019 1:11 PM 
________________________________________________________________________ I certify that the above Therapy Services are being furnished while the patient is under my care. I agree with the treatment plan and certify that this therapy is necessary. [de-identified] Signature:____________Date:_________TIME:________ 
 
[employer], Date and Time must be completed for valid certification ** Please sign and return to [address] [place] [address], [address], [address] [place], [place] [address]y [address] 
[phone] fax

## 2019-03-04 NOTE — PROGRESS NOTES
PT DAILY TREATMENT NOTE/LUMBAR EVAL 10-18 Patient Name: Thelma Salmeron Date:3/4/2019 : 1945 [x]  Patient  Verified Payor: Barb Vincent / Plan: [facility] HUMANA MEDICARE CHOICE PPO/PFFS / Product Type: Managed Care Medicare / In time:1:06P  Out time: 2:10P Total Treatment Time (min): 64 Visit #: 1 of 12 Medicare/BCBS Only Total Timed Codes (min):  64 1:1 Treatment Time:  64  
 
Treatment Area: Low back pain [M54.5] Radiculopathy, lumbar region [M54.16] SUBJECTIVE Pain Level (0-10 scale): 3/10 []constant []intermittent []improving [x]worsening []no change since onset Any medication changes, allergies to medications, adverse drug reactions, diagnosis change, or new procedure performed?: [x] No    [] Yes (see summary sheet for update) Subjective functional status/changes:    
PLOF: As of 2018 was able to bend over and tie shoes without sitting down without pain; was going to the  5-6 days per week doing the highest level of the [employer] program as well as \"[employer]\" and intermittently walking 30-45 min. Limitations to PLOF: pain and decreased ROM. Mechanism of Injury: Pain started 2019 after trip to [facility]. The next morning woke up and R hip was hurting and it got worse and worse. Ever since stents in 2018, had edema and fluid in legs and lungs (got better with cardiac rehab). Noticed in Dec that he felt like R LE was more swollen/felt different compared to L even before R hip started hurting. Current symptoms/Complaints: Aggravating factors: 1) Sitting down for 10 min causes 6-7/10 pain, 2) Getting up from sitting down for >10 min causes shooting pain of 8-9/10. 3) prolonged standing >3 hours. Eases: 1) Laying down flat on back with elevation of knee and cross body stretch for a minute or 2. 2) Standing and walking for about 2-3 min.  Has been following recommendations of book, \"Sciatica\" by Dr. Kale Shen. Has not experienced centralization with prone on elbows. Morning is best time/less pain. Previous Treatment/Compliance: Good - has purchased a book on Sciatica and has been trying to perform the exercises. Has been taking the narcotics prescribed sparingly - does not want to take them. PMHx/Surgical Hx: Cardiac stent x3, Renal Stent Jun 2018. Two of these stents went through femoral artery, HTN medications Work Hx: retired, but does yardwork and Silver sneakers exercise. Living Situation: Lives with wife in [facility] house with 4 LALI and 14 Steps to second floor with L handrail. Pt Goals: I would like to be able to put my socks on by myself. Barriers: []pain [x]financial []time []transportation []other Motivation: Excellent Substance use: []Alcohol []Tobacco []other:  
FABQ Score: []low []elevate Cognition: A & O x 4    Other: OBJECTIVE/EXAMINATION Domestic Life: see above Activity/Recreational Limitations: None - has become a \"gym junky\" who loves exercising in the last 8-9 months. Mobility: see below Self Care: I with all ADLs 141/60 mmHg HR 58bpm 
SpO2 98% Timed Sit/Stand Test: 12 reps SLS: R - 10 secs, L - 3 secs 15 min [x]Eval                  []Re-Eval  
 
 
12 min Therapeutic Exercise:  [x] See flow sheet :  
Rationale: increase ROM and increase strength to improve the patients ability to improve A/ROM and decrease pain with movement. 15 min Therapeutic Activity:  [x]  See flow sheet :  
Rationale: increase strength and improve coordination  to improve the patients ability to improve the patients ability perform basic ADLs and job-related tasks without pain. 14 min Manual Therapy:  Grade 3-4 P/A mobs to lumbar spine in prone Rationale: decrease pain, increase ROM and increase tissue extensibility to increase activity tolerance. With 
 [x] TE 
 [x] TA 
 [] neuro 
 [] other: Patient Education: [x] Review HEP [x] Progressed/Changed HEP based on: [] positioning   [] body mechanics   [] transfers   [] heat/ice application   
[] other:   
 
Other Objective/Functional Measures: See above Physical Therapy Evaluation - Lumbar Spine ([facility]) SUBJECTIVE Symptoms: 
Aggravated by: 
 [x] Bending [] Sitting [] Standing [] Walking 
 [] Moving [] Cough [] Sneeze [] Valsalva 
 [] AM  [] PM  Lying:  [] sup   [] pro   [] sidelying 
 [] Other: 
  
Eased by:  
 [] Bending [] Sitting [x] Standing [x] Walking 
 [x] Moving [] AM  [] PM  Lying: [x] sup  [] pro  [] sidelying 
 [] Other: 
  
General Health:  Red Flags Indicated? [] Yes    [x] No 
[] Yes [x] No Recent weight change (If yes, due to dieting? [] Yes  [] No) [] Yes [x] No Weakness in legs during walking 
[] Yes [x] No Unremitting pain at night 
[] Yes [x] No Abdominal pain or problems 
[] Yes [x] No Rectal bleeding 
[] Yes [x] No Feet more cold or painful in cold weather 
[] Yes [x] No Menstrual irregularities 
[] Yes [x] No Blood or pain with urination 
[] Yes [x] No Dysfunction of bowel or bladder 
[] Yes [x] No Recent illness within past 3 weeks (i.e, cold, flu) 
[] Yes [x] No Numbness/tingling in buttock/genitalia region Past History/Treatments:  
 
Diagnostic Tests: [] Lab work [x] X-rays    [] CT [x] MRI     [] Other: 
Results: Some bursitis and minimal stenosis, no full impingement. Functional Status Prior level of function: 
Present functional limitations: 
What position do you sleep in?: sidelying OBJECTIVEPosture: 
Lateral Shift: [] R    [] L     [] +  [x] - Kyphosis: [x] Increased [] Decreased   []  WNL Lordosis:  [] Increased [x] Decreased   [] WNL Pelvic symmetry: [] WNL    [] Other: 
 
Gait:  [] Normal     [x] Abnormal: increased trunk forward flexion, minimal hip rotation Active Movements: [] N/A   [] Too acute   [] Other: ROM % AROM % PROM Comments:pain, area Forward flexion 40-60 20% Extension 20-30 30% SB right 20-30 10% SB left 20-30 10% Rotation right 5-10 25% Rotation left 5-10 25% Repeated Movements Effects on present pain: produces (DC), abolishes (A), increases (incr), decreases (decr), centralizes (C), peripheral (PH), no effect (NE) Pre-Test Sx Flexion Repeated Flexion Extension Repeated Extension Repeated SBL Repeated SBR Sitting  Lamar Lamar Dec      
Standing  Lamar Lamar Dec C Lying    C C N/A N/A Comments: 
Side Glide: 
Sustained passive positioning test:  
 
Neuro Screen [x] WNL Myotome/Dermatome/Reflexes: Hyperactive at 3+ throughout B LEs Comments: 
 
Dural Mobility: SLR Sitting: [x] R    [x] L    [x] +    [] -  @ (degrees):   
        Supine: [x] R    [x] L    [x] +    [] -  @ (degrees): 40 degs Slump Test: [] R    [] L    [x] +    [] -  @ (degrees): 30 degs - unable to completely extend either knee in sitting position Prone Knee Bend: [x] R    [x] L    [] +    [x] - Palpation [] Min  [x] Mod  [] Severe    Location: R gluteus terrance muscle belly [] Min  [] Mod  [] Severe    Location: 
[] Min  [] Mod  [] Severe    Location: 
 
Strength 
 L(0-5) R (0-5) N/T Hip Flexion (L1,2) 3 3 [] Knee Extension (L3,4) 3+ 3+ [] Ankle Dorsiflexion (L4) 4- 4- [] Great Toe Extension (L5) 4- 4- [] Ankle Plantarflexion (S1)   [] Knee Flexion (S1,2) 3+ 3+ [] Upper Abdominals 3 3 [] Lower Abdominals 3 3 [] Paraspinals   [] Back Rotators   [] Gluteus Terrance 3 3- [] Other   [] Special Tests Lumbar:  Lumb. Compression: [] Pos  [] Neg            
  Lumbar Distraction:   [] Pos  [] Neg 
  Quadrant:  [] Pos  [] Neg   [] Flex  [] Ext Sacroilliac:  Gaenslen's: [x] R    [x] L    [] +    [x] - Compression: [] +    [] -  
  Gapping:  [] +    [] - Thigh Thrust: [] R    [] L    [] +    [] - Leg Length: [] +    [] -   Position: 
  Crests: 
  ASIS: 
  PSIS: 
  Sacral Sulcus: 
  Mobility: Standing flex: 
   Sitting flex: 
   Supine to sit: 
   Prone knee bend: Hip: Winifred Kwaku:  [x] R    [x] L    [] +    [x] - Scour:  [] R    [] L    [] +    [] - Piriformis: [] R    [] L    [] +    [] - Deficits: Arpan's: [x] R    [] L    [x] +    [] - [place]: [] R    [] L    [] +    [] - Hamstrings 90/90: Unable Gastrocsoleus (to neutral): Right: Left: 
    
Global Muscular Weakness: 
Abdominals: 3/5 Quadratus Lumborum: 
Paraspinals: Other: 
 
Other tests/comments: 
  
 
Pain Level (0-10 scale) post treatment: 1/10 ASSESSMENT/Changes in Function: Patient is a pleasant, active older adult with sub-acute onset of R flank pain with radicular symptoms. Objective findings are positive for L4-L5 nerve root irritation with extension preference. Treatment will focus on Marisol Extension progression with progressive return to previous active lifestyle along with back strengthening exercises for long-term gains and injury prevention. Patient will continue to benefit from skilled PT services to modify and progress therapeutic interventions, address functional mobility deficits, address ROM deficits, address strength deficits, analyze and address soft tissue restrictions, analyze and cue movement patterns, analyze and modify body mechanics/ergonomics, assess and modify postural abnormalities and address imbalance/dizziness to attain remaining goals. [x]  See Plan of Care 
[]  See progress note/recertification 
[]  See Discharge Summary Progress towards goals / Updated goals: 
Short Term Goals: To be accomplished in 6 treatments: 1. Patient will decrease pain during aggravating activities by 2 points on Verbal Analog Scale (Eval: 8) to increase participation on Activities of Daily Living such as bending and stooping.   
2. Patient will demonstrate increased strength by ½ grade on MMT (Eval: 3/5) throughout core and lumbar spine to improve functional participation in such tasks as prolonged standing and sitting.  
       3.   Patient will improve sitting tolerance time by 10 min (Eval: 10 min) without increased pain.   
  
Long Term Goals: To be accomplished in 12 treatments: 1. Patient will decrease pain during aggravating activities by 4 points on Verbal Analog Scale (Eval: 8/10) to increase participation on Activities of Daily Living such as squatting, lifting and carrying moderate to heavy items. 2. Patient will demonstrate increased strength by 1 grade on MMT (Eval: 3/5) to improve functional participation in such tasks as standing and sitting for greater than 30 min without pain (Eval 10 min).   
      3.   Patient will achieve predicted FOTO scores to demonstrate decreased functional impairment to facilitate improved participation in activities of daily living, improve overall quality of life. PLAN [x]  Upgrade activities as tolerated     []  Continue plan of care 
[]  Update interventions per flow sheet      
[]  Discharge due to:_ 
[]  Other:_ Cindy White, PT 3/4/2019  1:09 PM

## 2019-03-05 ENCOUNTER — APPOINTMENT (OUTPATIENT)
Dept: PHYSICAL THERAPY | Age: 74
End: 2019-03-05
Payer: MEDICARE

## 2019-03-05 ENCOUNTER — HOSPITAL ENCOUNTER (OUTPATIENT)
Dept: PHYSICAL THERAPY | Age: 74
Discharge: HOME | End: 2019-03-05
Payer: MEDICARE

## 2019-03-05 PROCEDURE — 97530 THERAPEUTIC ACTIVITIES: CPT

## 2019-03-05 PROCEDURE — 97110 THERAPEUTIC EXERCISES: CPT

## 2019-03-05 NOTE — PROGRESS NOTES
PT DAILY TREATMENT NOTE 10-18    Patient Name: Suzie Ruiz  Date:3/5/2019  : 1945  [x]  Patient  Verified  Payor: Anuel Paredes / Plan: [facility] HUMAN MEDICARE CHOICE PPO/PFFS / Product Type: Managed Care Medicare /    In time:11:29  Out time:12:19  Total Treatment Time (min): 48  Visit #: 2 of 12    Medicare/BCBS Only   Total Timed Codes (min):  38 1:1 Treatment Time:  30       Treatment Area: Low back pain [M54.5]  Radiculopathy, lumbar region [M54.16]    SUBJECTIVE  Pain Level (0-10 scale): 0-back, 3-4 hip  Any medication changes, allergies to medications, adverse drug reactions, diagnosis change, or new procedure performed?: [x] No    [] Yes (see summary sheet for update)  Subjective functional status/changes:   [] No changes reported  States his back is doing well    OBJECTIVE    Modality rationale: decrease edema, decrease inflammation and decrease pain to improve the patients ability to ease with tolerance to ADLs   Min Type Additional Details    [] Estim:  []Unatt       []IFC  []Premod                        []Other:  []w/ice   []w/heat  Position:  Location:    [] Estim: []Att    []TENS instruct  []NMES                    []Other:  []w/US   []w/ice   []w/heat  Position:  Location:    []  Traction: [] Cervical       []Lumbar                       [] Prone          []Supine                       []Intermittent   []Continuous Lbs:  [] before manual  [] after manual    []  Ultrasound: []Continuous   [] Pulsed                           []1MHz   []3MHz W/cm2:  Location:    []  Iontophoresis with dexamethasone         Location: [] Take home patch   [] In clinic   10 [x]  Ice     []  heat  []  Ice massage  []  Laser   []  Anodyne Position: prone  Location: low back/ buttock    []  Laser with stim  []  Other:  Position:  Location:    []  Vasopneumatic Device Pressure:       [] lo [] med [] hi   Temperature: [] lo [] med [] hi   [] Skin assessment post-treatment:  []intact []redness- no adverse reaction    []redness  adverse reaction:       26 min Therapeutic Exercise:  [x] See flow sheet :   Rationale: increase strength, improve coordination and increase proprioception to improve the patients ability to perform daily household chores. 12 min Therapeutic Activity:  [x]  See flow sheet :   Rationale: increase strength, improve coordination, improve balance and increase proprioception  to improve the patients ability to assist with tolerance to ADLs. With   [] TE   [] TA   [] neuro   [] other: Patient Education: [x] Review HEP    [] Progressed/Changed HEP based on:   [] positioning   [] body mechanics   [] transfers   [] heat/ice application    [] other:      Other Objective/Functional Measures: Tolerated initiation of exercises well with no changes in symptoms  Requires frequent cueing for form with squats and dead lift to assist with core control and proper technique      Pain Level (0-10 scale) post treatment: 0    ASSESSMENT/Changes in Function: Patient continues to show improvements with signs/ symptoms however still demonstrates a decrease in strength, impaired ROM,  and an increase in pain with functional activities. Patient will continue to benefit from skilled PT services to modify and progress therapeutic interventions, address functional mobility deficits, address strength deficits, analyze and cue movement patterns and analyze and modify body mechanics/ergonomics to attain remaining goals. [x]  See Plan of Care  []  See progress note/recertification  []  See Discharge Summary         Progress towards goals / Updated goals:  Short Term Goals: To be accomplished in 6 treatments:  1. Patient will decrease pain during aggravating activities by 2 points on Verbal Analog Scale (Eval: 8) to increase participation on Activities of Daily Living such as bending and stooping.    2. Patient will demonstrate increased strength by ½ grade on MMT (Eval: 3/5) throughout core and lumbar spine to improve functional participation in such tasks as prolonged standing and sitting.         3.   Patient will improve sitting tolerance time by 10 min (Eval: 10 min) without increased pain.       Long Term Goals: To be accomplished in 12 treatments:  1. Patient will decrease pain during aggravating activities by 4 points on Verbal Analog Scale (Eval: 8/10) to increase participation on Activities of Daily Living such as squatting, lifting and carrying moderate to heavy items. 2. Patient will demonstrate increased strength by 1 grade on MMT (Eval: 3/5) to improve functional participation in such tasks as standing and sitting for greater than 30 min without pain (Eval 10 min).          3.   Patient will achieve predicted FOTO scores to demonstrate decreased functional impairment to facilitate improved participation in activities of daily living, improve overall quality of life.          PLAN  [x]  Upgrade activities as tolerated     [x]  Continue plan of care  []  Update interventions per flow sheet       []  Discharge due to:_  []  Other:_      Kim Alvarado PT 3/5/2019  7:43 AM    Future Appointments   Date Time Provider Eugenio Foreman   3/5/2019 11:30 AM Shilo García, PT [facility]   3/7/2019 12:00 PM Shilo García, PT [facility]   3/12/2019 10:00 AM Mery Almaraz, PTA [facility]   3/14/2019  1:30 PM Juancarlos Lira PTA [facility]   3/19/2019  9:30 AM Mery Almaraz PTA [facility]   3/21/2019  9:00 AM Shilo García, PT [facility]   3/26/2019 10:00 AM Mery Almaraz PTA [facility]   3/28/2019 12:00 PM Shilo García, PT [facility]   4/2/2019  9:30 AM Juancarlos Lira, PTA [facility]   4/4/2019  9:30 AM Shilo García, PT [facility]   4/9/2019  9:30 AM Juancarlos Lira PTA [facility]   4/11/2019  9:45 AM Pawan King  [address]   4/11/2019  2:30 PM Terry Durbin PT [facility]   7/1/2019  9:15 AM Lesa Ochoa MD [address] [place] [address]   1/31/2020 9:00 AM Kiara Smith MD [address] [place] [address]

## 2019-03-07 ENCOUNTER — HOSPITAL ENCOUNTER (OUTPATIENT)
Dept: PHYSICAL THERAPY | Age: 74
Discharge: HOME | End: 2019-03-07
Payer: MEDICARE

## 2019-03-07 PROCEDURE — 97530 THERAPEUTIC ACTIVITIES: CPT

## 2019-03-07 PROCEDURE — 97110 THERAPEUTIC EXERCISES: CPT

## 2019-03-07 NOTE — PROGRESS NOTES
PT DAILY TREATMENT NOTE 10-18    Patient Name: Josh De Jesus  Date:3/7/2019  : 1945  [x]  Patient  Verified  Payor: Kalyani Navarro / Plan: [facility] HUMANA MEDICARE CHOICE PPO/PFFS / Product Type: Managed Care Medicare /    In time:12:00  Out time:12:46  Total Treatment Time (min): 36  Visit #: 3 of 12    Medicare/BCBS Only   Total Timed Codes (min):  36 1:1 Treatment Time:  36       Treatment Area: Low back pain [M54.5]  Radiculopathy, lumbar region [M54.16]    SUBJECTIVE  Pain Level (0-10 scale): 4  Any medication changes, allergies to medications, adverse drug reactions, diagnosis change, or new procedure performed?: [x] No    [] Yes (see summary sheet for update)  Subjective functional status/changes:   [] No changes reported  States he had the pain down his leg this morning     OBJECTIVE    Modality rationale: decrease edema, decrease inflammation and decrease pain to improve the patients ability to ease with tolerance to ADLs   Min Type Additional Details    [] Estim:  []Unatt       []IFC  []Premod                        []Other:  []w/ice   []w/heat  Position:  Location:    [] Estim: []Att    []TENS instruct  []NMES                    []Other:  []w/US   []w/ice   []w/heat  Position:  Location:    []  Traction: [] Cervical       []Lumbar                       [] Prone          []Supine                       []Intermittent   []Continuous Lbs:  [] before manual  [] after manual    []  Ultrasound: []Continuous   [] Pulsed                           []1MHz   []3MHz W/cm2:  Location:    []  Iontophoresis with dexamethasone         Location: [] Take home patch   [] In clinic   10 [x]  Ice     []  heat  []  Ice massage  []  Laser   []  Anodyne Position: prone  Location: low back     []  Laser with stim  []  Other:  Position:  Location:    []  Vasopneumatic Device Pressure:       [] lo [] med [] hi   Temperature: [] lo [] med [] hi   [] Skin assessment post-treatment:  []intact []redness- no adverse reaction    []redness  adverse reaction:       24 min Therapeutic Exercise:  [x] See flow sheet :   Rationale: increase ROM, increase strength, improve coordination and increase proprioception to improve the patients ability to perform daily household chores. 12 min Therapeutic Activity:  [x]  See flow sheet :   Rationale: improve coordination, improve balance and increase proprioception  to improve the patients ability to assist with community ambulation                With   [] TE   [] TA   [] neuro   [] other: Patient Education: [x] Review HEP    [] Progressed/Changed HEP based on:   [] positioning   [] body mechanics   [] transfers   [] heat/ice application    [] other:      Other Objective/Functional Measures:   Able to perform all therex well , require frequent cueing for form for deadlifts and squats  Able to correct with cues well      Pain Level (0-10 scale) post treatment: <4    ASSESSMENT/Changes in Function: Patient continues to show improvements with signs/ symptoms however still demonstrates a decrease in strength, impaired ROM, deficits with balance and an increase in pain with functional activities. Patient will continue to benefit from skilled PT services to modify and progress therapeutic interventions, address functional mobility deficits, address strength deficits, analyze and cue movement patterns and analyze and modify body mechanics/ergonomics to attain remaining goals. [x]  See Plan of Care  []  See progress note/recertification  []  See Discharge Summary         Progress towards goals / Updated goals:  Short Term Goals: To be accomplished in 6 treatments:  1. Patient will decrease pain during aggravating activities by 2 points on Verbal Analog Scale (Eval: 8) to increase participation on Activities of Daily Living such as bending and stooping.    2. Patient will demonstrate increased strength by ½ grade on MMT (Eval: 3/5) throughout core and lumbar spine to improve functional participation in such tasks as prolonged standing and sitting.         3.   Patient will improve sitting tolerance time by 10 min (Eval: 10 min) without increased pain.       Long Term Goals: To be accomplished in 12 treatments:  1. Patient will decrease pain during aggravating activities by 4 points on Verbal Analog Scale (Eval: 8/10) to increase participation on Activities of Daily Living such as squatting, lifting and carrying moderate to heavy items. 2. Patient will demonstrate increased strength by 1 grade on MMT (Eval: 3/5) to improve functional participation in such tasks as standing and sitting for greater than 30 min without pain (Eval 10 min).           3.   Patient will achieve predicted FOTO scores to demonstrate decreased functional impairment to facilitate improved participation in activities of daily living, improve overall quality of life.            PLAN  [x]  Upgrade activities as tolerated     [x]  Continue plan of care  []  Update interventions per flow sheet       []  Discharge due to:_  []  Other:_      Josh Peck PT 3/7/2019  7:50 AM    Future Appointments   Date Time Provider Eugenio Foreman   3/7/2019 12:00 PM Gilles Gipson PT [facility]   3/12/2019 10:00 AM Mery Almaraz, PTA [facility]   3/14/2019  1:30 PM Kayce Fair PTA [facility]   3/19/2019  9:30 AM Mery Almaraz, PTA [facility]   3/21/2019  9:00 AM Gilles Gipson PT [facility]   3/26/2019 10:00 AM Mery Almaraz PTA [facility]   3/28/2019 12:00 PM Gilles Gipson PT [facility]   4/2/2019  9:30 AM Kayce Fair PTA [facility]   4/4/2019  9:30 AM Gilles Gipson PT [facility]   4/9/2019  9:30 AM Terie Fair, PTA [facility]   4/11/2019  9:45 AM Cisco Contreras  [address]   4/11/2019  2:30 PM Ronnette Sandifer, PT [facility]   7/1/2019  9:15 AM Kelly Carpenter MD [address] [place] [address]   1/31/2020  9:00 AM Kelly Carpenter MD [address] [place] [address]

## 2019-03-12 ENCOUNTER — HOSPITAL ENCOUNTER (OUTPATIENT)
Dept: PHYSICAL THERAPY | Age: 74
Discharge: HOME | End: 2019-03-12
Payer: MEDICARE

## 2019-03-12 PROCEDURE — 97032 APPL MODALITY 1+ESTIM EA 15: CPT

## 2019-03-12 PROCEDURE — 97110 THERAPEUTIC EXERCISES: CPT

## 2019-03-12 NOTE — PROGRESS NOTES
PT DAILY TREATMENT NOTE 10-18    Patient Name: Nabil Snowden  Date:3/12/2019  : 1945  [x]  Patient  Verified  Payor: Sanjuana Mix / Plan: [facility] [employer] MEDICARE CHOICE PPO/PFFS / Product Type: Managed Care Medicare /    In time:10:01   Out time:10:53  Total Treatment Time (min): 50  Visit #: 4 of 12    Medicare/BCBS Only   Total Timed Codes (min):  50 1:1 Treatment Time:  50       Treatment Area: Low back pain [M54.5]  Radiculopathy, lumbar region [M54.16]    SUBJECTIVE  Pain Level (0-10 scale): 5  Any medication changes, allergies to medications, adverse drug reactions, diagnosis change, or new procedure performed?: [x] No    [] Yes (see summary sheet for update)  Subjective functional status/changes:   [] No changes reported  im not  Sure  If  Im  Getting  Better.     OBJECTIVE    Modality rationale: decrease edema, decrease inflammation, decrease pain and increase tissue extensibility to improve the patients ability to perform ADL    Min Type Additional Details    [] Estim:  []Unatt       []IFC  []Premod                        []Other:  []w/ice   []w/heat  Position:  Location:   10 [x] Estim: [x]Att    []TENS instruct  []NMES                    [x]Other:LTO  []w/US   []w/ice   []w/heat  Position: prone  Location:right  SIJ/upper  Right  glute  region    []  Traction: [] Cervical       []Lumbar                       [] Prone          []Supine                       []Intermittent   []Continuous Lbs:  [] before manual  [] after manual    []  Ultrasound: []Continuous   [] Pulsed                           []1MHz   []3MHz W/cm2:  Location:    []  Iontophoresis with dexamethasone         Location: [] Take home patch   [] In clinic    []  Ice     []  heat  []  Ice massage  []  Laser   []  Anodyne Position:  Location:    []  Laser with stim  []  Other:  Position:  Location:    []  Vasopneumatic Device Pressure:       [] lo [] med [] hi   Temperature: [] lo [] med [] hi   [x] Skin assessment post-treatment:  [x]intact []redness- no adverse reaction    []redness  adverse reaction:      min []Eval                  []Re-Eval       40 min Therapeutic Exercise:  [x] See flow sheet :   Rationale: increase ROM and increase strength to improve the patients ability to perform ADL      min Therapeutic Activity:  []  See flow sheet :   Rationale: increase ROM and increase strength  to improve the patients ability to perform ADL       min Neuromuscular Re-education:  []  See flow sheet :   Rationale:   to improve the patients ability to      min Manual Therapy:     Rationale: decrease pain, increase ROM, increase tissue extensibility and decrease edema  to perform ADL      min Gait Training:  ___ feet with ___ device on level surfaces with ___ level of assist   Rationale: With   [x] TE   [] TA   [] neuro   [] other: Patient Education: [x] Review HEP    [] Progressed/Changed HEP based on:   [] positioning   [] body mechanics   [] transfers   [] heat/ice application    [] other:      Other Objective/Functional Measures: tightness  Right HS     Pain Level (0-10 scale) post treatment:0     ASSESSMENT/Changes in Function: pt  Reported  benfit  With piriformis/HS/LTO  Today. Discussed  With  Pt  On performing stretches  2  X day. Patient will continue to benefit from skilled PT services to address functional mobility deficits, address ROM deficits, address strength deficits, analyze and address soft tissue restrictions, analyze and cue movement patterns, analyze and modify body mechanics/ergonomics and instruct in home and community integration to attain remaining goals. [x]  See Plan of Care  []  See progress note/recertification  []  See Discharge Summary         Progress towards goals / Updated goals:  Short Term Goals: To be accomplished in 6 treatments:  1.  Patient will decrease pain during aggravating activities by 2 points on Verbal Analog Scale (Eval: 8) to increase participation on Activities of Daily Living such as bending and stooping. Before  Treatment  5  Post  0   3/12/19  2. Patient will demonstrate increased strength by ½ grade on MMT (Eval: 3/5) throughout core and lumbar spine to improve functional participation in such tasks as prolonged standing and sitting.         3.   Patient will improve sitting tolerance time by 10 min (Eval: 10 min) without increased pain.       Long Term Goals: To be accomplished in 12 treatments:  1. Patient will decrease pain during aggravating activities by 4 points on Verbal Analog Scale (Eval: 8/10) to increase participation on Activities of Daily Living such as squatting, lifting and carrying moderate to heavy items. 2. Patient will demonstrate increased strength by 1 grade on MMT (Eval: 3/5) to improve functional participation in such tasks as standing and sitting for greater than 30 min without pain (Eval 10 min).           3.   Patient will achieve predicted FOTO scores to demonstrate decreased functional impairment to facilitate improved participation in activities of daily living, improve overall quality of life.            PLAN  []  Upgrade activities as tolerated     []  Continue plan of care  []  Update interventions per flow sheet       []  Discharge due to:_  []  Other:_      Jennifer Braxton PTA 3/12/2019  10:17 AM    Future Appointments   Date Time Provider Eugenio Foreman   3/14/2019  1:30 PM Gayle Crandall PTA [facility]   3/19/2019  9:30 AM Mery Almaraz PTA [facility]   3/21/2019  9:00 AM Marcos Buckner PT [facility]   3/26/2019 10:00 AM Mery Almaraz PTA [facility]   3/28/2019 12:00 PM Marcos Buckner PT [facility]   4/2/2019  9:30 AM Gayle Crandall PTA [facility]   4/4/2019  9:30 AM [facility]   4/9/2019  9:30 AM Gayle Crandall PTA [facility]   4/11/2019  9:45 AM Ardean Crigler,  [address]   4/11/2019  2:30 PM Rossana Campa PT [facility]   7/1/2019  9:15 AM Paul Alva, María Rao [facility]   1/31/2020  9:00 AM Lucinda Maldonado MD [address] [place] [address]

## 2019-03-14 ENCOUNTER — APPOINTMENT (OUTPATIENT)
Dept: PHYSICAL THERAPY | Age: 74
End: 2019-03-14
Payer: MEDICARE

## 2019-03-19 ENCOUNTER — HOSPITAL ENCOUNTER (OUTPATIENT)
Dept: PHYSICAL THERAPY | Age: 74
Discharge: HOME | End: 2019-03-19
Payer: MEDICARE

## 2019-03-19 PROCEDURE — 97110 THERAPEUTIC EXERCISES: CPT

## 2019-03-19 PROCEDURE — 97032 APPL MODALITY 1+ESTIM EA 15: CPT

## 2019-03-19 NOTE — PROGRESS NOTES
PT DAILY TREATMENT NOTE 10-18 Patient Name: Jack Mckenzie Date:3/19/2019 : 1945 [x]  Patient  Verified Payor: Allen Pendleton / Plan: [facility] [employer] MEDICARE CHOICE PPO/PFFS / Product Type: Managed Care Medicare / In time: 9:32  Out time:10:28 Total Treatment Time (min): 40 Visit #:  5 [id number] Medicare/BCBS Only Total Timed Codes (min):  40 1:1 Treatment Time:  30 Treatment Area: Low back pain [M54.5] Radiculopathy, lumbar region [M54.16] SUBJECTIVE Pain Level (0-10 scale): 5 Any medication changes, allergies to medications, adverse drug reactions, diagnosis change, or new procedure performed?: [x] No    [] Yes (see summary sheet for update) Subjective functional status/changes:   [] No changes reported The  Laser  Helped  For  2 days OBJECTIVE Modality rationale: decrease edema, decrease inflammation, decrease pain and increase tissue extensibility to improve the patients ability to perform ADL Min Type Additional Details  
 [] Estim:  []Unatt       []IFC  []Premod []Other:  []w/ice   []w/heat Position: Location:  
10 [x] Estim: [x]Att    []TENS instruct  []NMES [x]Other: LTO []w/US   []w/ice   []w/heat Position:prone Location:right  glute  region []  Traction: [] Cervical       []Lumbar 
                     [] Prone          []Supine []Intermittent   []Continuous Lbs: 
[] before manual 
[] after manual  
 []  Ultrasound: []Continuous   [] Pulsed []1MHz   []3MHz W/cm2: 
Location:  
 []  Iontophoresis with dexamethasone Location: [] Take home patch  
[] In clinic  
 []  Ice     []  heat 
[]  Ice massage 
[]  Laser  
[]  Anodyne Position: Location:  
 []  Laser with stim 
[]  Other:  Position: Location:  
 []  Vasopneumatic Device Pressure:       [] lo [] med [] hi  
Temperature: [] lo [] med [] hi  
 [x] Skin assessment post-treatment:  [x]intact []redness- no adverse reaction 
  []redness  adverse reaction:  
 
 min []Eval                  []Re-Eval  
 
 
30 min Therapeutic Exercise:  [x] See flow sheet :  
Rationale: increase ROM and increase strength to improve the patients ability to perform ADL  
 
 min Therapeutic Activity:  []  See flow sheet :  
Rationale:   to improve the patients ability to  
  
 min Neuromuscular Re-education:  []  See flow sheet :  
Rationale:   to improve the patients ability to  
 
 min Manual Therapy:    
Rationale: decrease pain, increase ROM, increase tissue extensibility and decrease edema  to perform ADL 
 
 min Gait Training:  ___ feet with ___ device on level surfaces with ___ level of assist  
Rationale: With 
 [x] TE 
 [] TA 
 [] neuro 
 [] other: Patient Education: [x] Review HEP [] Progressed/Changed HEP based on:  
[] positioning   [] body mechanics   [] transfers   [] heat/ice application   
[] other:   
 
Other Objective/Functional Measures:  
Right  HS   Tightness/tender  At  Right  piriformis Pain Level (0-10 scale) post treatment:  
 
ASSESSMENT/Changes in Function: pt  Completed   Each there ex  Fairly  Well. Discussed with pt on performing  Stretches  2  X  Day. Patient will continue to benefit from skilled PT services to address functional mobility deficits, address ROM deficits, address strength deficits, analyze and address soft tissue restrictions, analyze and cue movement patterns and instruct in home and community integration to attain remaining goals. [x]  See Plan of Care 
[]  See progress note/recertification 
[]  See Discharge Summary Progress towards goals / Updated goals: 
Short Term Goals: To be accomplished in 6 treatments: 1.  Patient will decrease pain during aggravating activities by 2 points on Verbal Analog Scale (Eval: 8) to increase participation on Activities of Daily Living such as bending and stooping. Before  Treatment  5  Post  0   3/12/19 2. Patient will demonstrate increased strength by ½ grade on MMT (Eval: 3/5) throughout core and lumbar spine to improve functional participation in such tasks as prolonged standing and sitting.  
      3.   Patient will improve sitting tolerance time by 10 min (Eval: 10 min) without increased pain.   
  
Long Term Goals: To be accomplished in 12 treatments: 1. Patient will decrease pain during aggravating activities by 4 points on Verbal Analog Scale (Eval: 8/10) to increase participation on Activities of Daily Living such as squatting, lifting and carrying moderate to heavy items. 2. Patient will demonstrate increased strength by 1 grade on MMT (Eval: 3/5) to improve functional participation in such tasks as standing and sitting for greater than 30 min without pain (Eval 10 min).   
      3.   Patient will achieve predicted FOTO scores to demonstrate decreased functional impairment to facilitate improved participation in activities of daily living, improve overall quality of life.  
  
 
 
 
PLAN 
[]  Upgrade activities as tolerated     [x]  Continue plan of care 
[]  Update interventions per flow sheet      
[]  Discharge due to:_ 
[]  Other:_   
 
Mery Almaraz PTA 3/19/2019  10:14 AM 
 
Future Appointments Date Time Provider Eugenio Foreman 3/21/2019  9:00 AM Monika Beckwith PT [facility]  
3/26/2019 10:00 AM Jmi Cotter PTA [facility]  
3/28/2019 12:00 PM Monika Beckwith PT [facility]  
4/2/2019  9:30 AM Jim Cotter PTA [facility]  
4/4/2019  9:30 AM [facility] PT [facility] 1 [facility]  
4/9/2019  9:30 AM Jim Cotter PTA [facility]  
4/11/2019  9:45 AM Shelley Haro  [address]  
4/11/2019  2:30 PM Harry Rivera, PT [facility]  
7/1/2019  9:15 AM Marily Blackmon MD [address] [place] [address]  
1/31/2020  9:00 AM Marily Blackmon MD [address] [place] [address]

## 2019-03-21 ENCOUNTER — HOSPITAL ENCOUNTER (OUTPATIENT)
Dept: PHYSICAL THERAPY | Age: 74
Discharge: HOME | End: 2019-03-21
Payer: MEDICARE

## 2019-03-21 PROCEDURE — 97530 THERAPEUTIC ACTIVITIES: CPT

## 2019-03-21 PROCEDURE — 97110 THERAPEUTIC EXERCISES: CPT

## 2019-03-21 NOTE — PROGRESS NOTES
PT DAILY TREATMENT NOTE 10-18 Patient Name: Viola Wang Date:3/21/2019 : 1945 [x]  Patient  Verified Payor: Yulia Gonzalez / Plan: [facility] MEDICARE CHOICE PPO/PFFS / Product Type: Managed Care Medicare / In time:9:01  Out time:9:50 Total Treatment Time (min): 49 Visit #: 6 of 12 Medicare/BCBS Only Total Timed Codes (min):  39 1:1 Treatment Time:  30 Treatment Area: Low back pain [M54.5] Radiculopathy, lumbar region [M54.16] SUBJECTIVE Pain Level (0-10 scale): 3-4 Any medication changes, allergies to medications, adverse drug reactions, diagnosis change, or new procedure performed?: [x] No    [] Yes (see summary sheet for update) Subjective functional status/changes:   [] No changes reported States he has been having relief in pain which he has not had for a long time. He now has days where there is no pain, he is attending water aerobics MWF OBJECTIVE Modality rationale: decrease edema, decrease inflammation and decrease pain to improve the patients ability to ease with tolerance to ADLs Min Type Additional Details  
 [] Estim:  []Unatt       []IFC  []Premod []Other:  []w/ice   []w/heat Position: Location:  
 [] Estim: []Att    []TENS instruct  []NMES []Other:  []w/US   []w/ice   []w/heat Position: Location:  
 []  Traction: [] Cervical       []Lumbar 
                     [] Prone          []Supine []Intermittent   []Continuous Lbs: 
[] before manual 
[] after manual  
 []  Ultrasound: []Continuous   [] Pulsed []1MHz   []3MHz W/cm2: 
Location:  
 []  Iontophoresis with dexamethasone Location: [] Take home patch  
[] In clinic  
10 [x]  Ice     []  heat 
[]  Ice massage 
[]  Laser  
[]  Anodyne Position:prone Location: low back  
 []  Laser with stim 
[]  Other:  Position: Location:  
 []  Vasopneumatic Device Pressure:       [] lo [] med [] hi  
 Temperature: [] lo [] med [] hi  
[] Skin assessment post-treatment:  []intact []redness- no adverse reaction 
  []redness  adverse reaction:  
 
 
20 min Therapeutic Exercise:  [x] See flow sheet :  
Rationale: increase ROM, increase strength and increase proprioception to improve the patients ability to perform daily household chores. 19 min Therapeutic Activity:  [x]  See flow sheet :  
Rationale: increase strength, improve coordination, improve balance and increase proprioception  to improve the patients ability to perform community ambulation With 
 [] TE 
 [] TA 
 [] neuro 
 [] other: Patient Education: [x] Review HEP [] Progressed/Changed HEP based on:  
[] positioning   [] body mechanics   [] transfers   [] heat/ice application   
[] other:   
 
Other Objective/Functional Measures:  
Patient request trial of no laser this session to determine if laser is source of decreased pain versus CP/MH Tolerated all therex well Require cueing to prevent forward rounded shoulders during squats and deadlifts Pain Level (0-10 scale) post treatment: <3 \"better\" ASSESSMENT/Changes in Function: Patient continues to show improvements with signs/ symptoms however still demonstrates a decrease in strength, poor body mechanics ,deficits with balance and an increase in pain with functional activities. Patient will continue to benefit from skilled PT services to modify and progress therapeutic interventions, address functional mobility deficits, address strength deficits, analyze and cue movement patterns and analyze and modify body mechanics/ergonomics to attain remaining goals. [x]  See Plan of Care 
[]  See progress note/recertification 
[]  See Discharge Summary Progress towards goals / Updated goals: 
Short Term Goals: To be accomplished in 6 treatments: 1.  Patient will decrease pain during aggravating activities by 2 points on Verbal Analog Scale (Eval: 8) to increase participation on Activities of Daily Living such as bending and stooping.   Before  Treatment  5  Post  0   3/12/19 2. Patient will demonstrate increased strength by ½ grade on MMT (Eval: 3/5) throughout core and lumbar spine to improve functional participation in such tasks as prolonged standing and sitting.  
      3.   Patient will improve sitting tolerance time by 10 min (Eval: 10 min) without increased pain.   
  
Long Term Goals: To be accomplished in 12 treatments: 1. Patient will decrease pain during aggravating activities by 4 points on Verbal Analog Scale (Eval: 8/10) to increase participation on Activities of Daily Living such as squatting, lifting and carrying moderate to heavy items. 2. Patient will demonstrate increased strength by 1 grade on MMT (Eval: 3/5) to improve functional participation in such tasks as standing and sitting for greater than 30 min without pain (Eval 10 min).   
      3.   Patient will achieve predicted FOTO scores to demonstrate decreased functional impairment to facilitate improved participation in activities of daily living, improve overall quality of life.  
  
 
 
 
PLAN [x]  Upgrade activities as tolerated     [x]  Continue plan of care 
[]  Update interventions per flow sheet      
[]  Discharge due to:_ 
[]  Other:_ María Elena Merino, PT 3/21/2019  7:39 AM 
 
Future Appointments Date Time Provider Eugenio Foreman 3/21/2019  9:00 AM Himanshu Jacques PT [facility]  
3/26/2019 10:00 AM Madalyn Stratton PTA [facility]  
3/28/2019 12:00 PM Himanshu Jacques PT [facility]  
4/2/2019  9:30 AM Madalyn Stratton PTA [facility]  
4/4/2019  9:30 AM [facility] PT [facility]  
4/9/2019  9:30 AM Madalyn Stratton PTA [facility]  
4/11/2019  9:45 AM Dipesh Aflaro  [address]  
4/11/2019  2:30 PM Mady Chand, PT [facility]  
7/1/2019  9:15 AM Ferd Mohs, MD [address] [place] [address]  
 1/31/2020  9:00 AM Ezequiel Garcia MD [address] [place] [address]

## 2019-03-26 ENCOUNTER — HOSPITAL ENCOUNTER (OUTPATIENT)
Dept: PHYSICAL THERAPY | Age: 74
Discharge: HOME | End: 2019-03-26
Payer: MEDICARE

## 2019-03-26 PROCEDURE — 97110 THERAPEUTIC EXERCISES: CPT

## 2019-03-26 NOTE — PROGRESS NOTES
PT DAILY TREATMENT NOTE 10-18 Patient Name: Ashley Peterson Date:3/26/2019 : 1945 [x]  Patient  Verified Payor: Glo Ramirez / Plan: [facility] [employer] MEDICARE CHOICE PPO/PFFS / Product Type: Managed Care Medicare / In time: 10:05  Out time 10:49 Total Treatment Time (min): 40 Visit #: 7 of 12 Medicare/BCBS Only Total Timed Codes (min):  40 1:1 Treatment Time:  21 Treatment Area: Low back pain [M54.5] Radiculopathy, lumbar region [M54.16] SUBJECTIVE Pain Level (0-10 scale): 4-5 Any medication changes, allergies to medications, adverse drug reactions, diagnosis change, or new procedure performed?: [x] No    [] Yes (see summary sheet for update) Subjective functional status/changes:   [] No changes reported Pt reports  Benefit  With LTO. OBJECTIVE Modality rationale: decrease edema, decrease inflammation, decrease pain and increase tissue extensibility to improve the patients ability to perform ADL Min Type Additional Details  
 [] Estim:  []Unatt       []IFC  []Premod []Other:  []w/ice   []w/heat Position: Location:  
10 [x] Estim: [x]Att    []TENS instruct  []NMES []Other: LTO []w/US   []w/ice   []w/heat Position:prone Location:right  glute  Region/piriformis []  Traction: [] Cervical       []Lumbar 
                     [] Prone          []Supine []Intermittent   []Continuous Lbs: 
[] before manual 
[] after manual  
 []  Ultrasound: []Continuous   [] Pulsed []1MHz   []3MHz W/cm2: 
Location:  
 []  Iontophoresis with dexamethasone Location: [] Take home patch  
[] In clinic  
 []  Ice     []  heat 
[]  Ice massage 
[]  Laser  
[]  Anodyne Position: Location:  
 []  Laser with stim 
[]  Other:  Position: Location:  
 []  Vasopneumatic Device Pressure:       [] lo [] med [] hi  
Temperature: [] lo [] med [] hi  
 [x] Skin assessment post-treatment:  [x]intact []redness- no adverse reaction 
  []redness  adverse reaction:  
 
 min []Eval                  []Re-Eval  
 
 
30 min Therapeutic Exercise:  [] See flow sheet :  
Rationale: increase ROM and increase strength to improve the patients ability to perform ADL  
 
 min Therapeutic Activity:  []  See flow sheet :  
Rationale:   to improve the patients ability to  
  
 min Neuromuscular Re-education:  []  See flow sheet :  
Rationale:   to improve the patients ability to  
 
 min Manual Therapy:    
Rationale: decrease pain, increase ROM, increase tissue extensibility and decrease edema  to perform ADL 
 
 min Gait Training:  ___ feet with ___ device on level surfaces with ___ level of assist  
Rationale: With 
 [x] TE 
 [] TA 
 [] neuro 
 [] other: Patient Education: [x] Review HEP [] Progressed/Changed HEP based on:  
[] positioning   [] body mechanics   [] transfers   [] heat/ice application   
[] other:   
 
Other Objective/Functional Measures:  
Increased  Time TM Pain Level (0-10 scale) post treatment:2  
 
ASSESSMENT/Changes in Function: Fair  Response  To each there ex. Patient will continue to benefit from skilled PT services to address functional mobility deficits, address ROM deficits, address strength deficits, analyze and address soft tissue restrictions, analyze and cue movement patterns and instruct in home and community integration to attain remaining goals. []  See Plan of Care 
[]  See progress note/recertification 
[]  See Discharge Summary Progress towards goals / Updated goals: 
Short Term Goals: To be accomplished in 6 treatments: 1. Patient will decrease pain during aggravating activities by 2 points on Verbal Analog Scale (Eval: 8) to increase participation on Activities of Daily Living such as bending and stooping.   Before  Treatment  5  Post  0   3/12/19 2. Patient will demonstrate increased strength by ½ grade on MMT (Eval: 3/5) throughout core and lumbar spine to improve functional participation in such tasks as prolonged standing and sitting.  
      3.   Patient will improve sitting tolerance time by 10 min (Eval: 10 min) without increased pain.   progressing  3/26/19 
  
Long Term Goals: To be accomplished in 12 treatments: 1. Patient will decrease pain during aggravating activities by 4 points on Verbal Analog Scale (Eval: 8/10) to increase participation on Activities of Daily Living such as squatting, lifting and carrying moderate to heavy items. 2. Patient will demonstrate increased strength by 1 grade on MMT (Eval: 3/5) to improve functional participation in such tasks as standing and sitting for greater than 30 min without pain (Eval 10 min).   
      3.   Patient will achieve predicted FOTO scores to demonstrate decreased functional impairment to facilitate improved participation in activities of daily living, improve overall quality of life.  
  
 
 
 
PLAN 
[]  Upgrade activities as tolerated     []  Continue plan of care 
[]  Update interventions per flow sheet      
[]  Discharge due to:_ 
[]  Other:_   
 
Mery Almaraz PTA 3/26/2019  10:24 AM 
 
Future Appointments Date Time Provider Eugenio Foreman 3/28/2019  1:00 PM [facility]  
4/2/2019  9:30 AM Keeley Florez PTA [facility]  
4/4/2019  9:30 AM Pablo Ayala PT [facility]  
4/9/2019  9:30 AM Keeley Florez PTA [facility]  
4/11/2019  9:45 AM Bk Hua  [address]  
4/11/2019  2:30 PM Bailee Akers, PT [facility]  
7/1/2019  9:15 AM Romana Richter, MD [address] [place] [address]  
1/31/2020  9:00 AM Romana Richter, MD [address] [place] [address]

## 2019-03-28 ENCOUNTER — HOSPITAL ENCOUNTER (OUTPATIENT)
Dept: PHYSICAL THERAPY | Age: 74
Discharge: HOME | End: 2019-03-28
Payer: MEDICARE

## 2019-03-28 PROCEDURE — 97110 THERAPEUTIC EXERCISES: CPT

## 2019-03-28 NOTE — PROGRESS NOTES
PT DAILY TREATMENT NOTE 10-18 Patient Name: Lluvia Mcknight Date:3/28/2019 : 1945 [x]  Patient  Verified Payor: Steve Winkler / Plan: [facility] [employer] MEDICARE CHOICE PPO/PFFS / Product Type: Managed Care Medicare / In time: 1:04  Out time: 2:00 Total Treatment Time (min): 56 Visit #: 8 of 12 Medicare/BCBS Only Total Timed Codes (min):  46 1:1 Treatment Time:  46  
 
 
Treatment Area: Low back pain [M54.5] Radiculopathy, lumbar region [M54.16] SUBJECTIVE Pain Level (0-10 scale): 6/10 with radicular sx in right LE to ankle Any medication changes, allergies to medications, adverse drug reactions, diagnosis change, or new procedure performed?: [x] No    [] Yes (see summary sheet for update) Subjective functional status/changes:   [] No changes reported I didn't take any Tylenol, I wanted to see how I would do without it. I have an appt with Dr. Mt Bynum next Tuesday. OBJECTIVE Modality rationale: decrease edema, decrease inflammation, decrease pain and increase tissue extensibility to improve the patients ability to perform ADL Min Type Additional Details  
 [] Estim:  []Unatt       []IFC  []Premod []Other:  []w/ice   []w/heat Position: Location:  
 [] Estim: []Att    []TENS instruct  []NMES []Other: LTO []w/US   []w/ice   []w/heat Position:prone Location:right  glute  Region/piriformis []  Traction: [] Cervical       []Lumbar 
                     [] Prone          []Supine []Intermittent   []Continuous Lbs: 
[] before manual 
[] after manual  
 []  Ultrasound: []Continuous   [] Pulsed []1MHz   []3MHz W/cm2: 
Location:  
 []  Iontophoresis with dexamethasone Location: [] Take home patch  
[] In clinic  
10 []  Ice     [x]  heat 
[]  Ice massage 
[]  Laser  
[]  Anodyne Position: prone Location: L/S and glute  
 []  Laser with stim 
[]  Other:  Position: Location:  
 []  Vasopneumatic Device Pressure:       [] lo [] med [] hi  
Temperature: [] lo [] med [] hi  
[x] Skin assessment post-treatment:  [x]intact []redness- no adverse reaction 
  []redness  adverse reaction:  
 
 min []Eval                  []Re-Eval  
 
 
46 min Therapeutic Exercise:  [] See flow sheet :  
Rationale: increase ROM and increase strength to improve the patients ability to perform ADL  
 
 min Therapeutic Activity:  []  See flow sheet :  
Rationale:   to improve the patients ability to  
  
 min Neuromuscular Re-education:  []  See flow sheet :  
Rationale:   to improve the patients ability to  
 
 min Manual Therapy:    
Rationale: decrease pain, increase ROM, increase tissue extensibility and decrease edema  to perform ADL 
 
 min Gait Training:  ___ feet with ___ device on level surfaces with ___ level of assist  
Rationale: With 
 [x] TE 
 [] TA 
 [] neuro 
 [] other: Patient Education: [x] Review HEP [] Progressed/Changed HEP based on:  
[] positioning   [] body mechanics   [] transfers   [] heat/ice application   
[] other:   
 
Other Objective/Functional Measures:  
- Decrease in pain from 6/10 to 3/10 with THI x 2' and PPU 2 x 10 - Pt reports he is not sure the LTO is helping, he would like to try e-stim today - unable due to contraindications, will try prone MHP 
- Decreased pain and able to fully extend legs on Leg Press with seat reclined into supine Pain Level (0-10 scale) post treatment: 3/10 with radicular sx into right buttock ASSESSMENT/Changes in Function:  
Patient with good response and centralization with prone progression. Will continue to progress as able with monitoring sx.   
 
Patient will continue to benefit from skilled PT services to address functional mobility deficits, address ROM deficits, address strength deficits, analyze and address soft tissue restrictions, analyze and cue movement patterns and instruct in home and community integration to attain remaining goals. []  See Plan of Care 
[]  See progress note/recertification 
[]  See Discharge Summary Progress towards goals / Updated goals:  
Short Term Goals: To be accomplished in 6 treatments: 1. Patient will decrease pain during aggravating activities by 2 points on Verbal Analog Scale (Eval: 8) to increase participation on Activities of Daily Living such as bending and stooping.   Before  Treatment  5  Post  0   3/12/19 2. Patient will demonstrate increased strength by ½ grade on MMT (Eval: 3/5) throughout core and lumbar spine to improve functional participation in such tasks as prolonged standing and sitting.  
      3.   Patient will improve sitting tolerance time by 10 min (Eval: 10 min) without increased pain.   progressing  3/26/19 
  
Long Term Goals: To be accomplished in 12 treatments: 1. Patient will decrease pain during aggravating activities by 4 points on Verbal Analog Scale (Eval: 8/10) to increase participation on Activities of Daily Living such as squatting, lifting and carrying moderate to heavy items. 2. Patient will demonstrate increased strength by 1 grade on MMT (Eval: 3/5) to improve functional participation in such tasks as standing and sitting for greater than 30 min without pain (Eval 10 min).   
      3.   Patient will achieve predicted FOTO scores to demonstrate decreased functional impairment to facilitate improved participation in activities of daily living, improve overall quality of life.  
  
PLAN [x]  Upgrade activities as tolerated     [x]  Continue plan of care 
[]  Update interventions per flow sheet      
[]  Discharge due to:_ 
[]  Other:_   
 
MAGGIE Yadav 3/28/2019  2:00 PM 
 
Future Appointments Date Time Provider Eugenio Foreman 3/28/2019  1:00 PM [facility] PT [facility] 1 [facility]  
4/2/2019  9:30 AM Mery Almaraz PTA [facility]  
 4/2/2019  1:30 PM Sami Diaz MD [facility]  
4/4/2019  9:30 AM Daljit Mehta, PT [facility]  
4/9/2019  9:30 AM Sona Coronado, PTA [facility]  
4/11/2019  2:30 PM Gina Bravo, PT [facility]  
7/1/2019  9:15 AM Mai Galeazzi, MD [address] [place] [address]  
1/31/2020  9:00 AM Mai Galeazzi, MD [address] [place] [address]

## 2019-04-02 ENCOUNTER — OFFICE VISIT (OUTPATIENT)
Dept: ORTHOPEDIC SURGERY | Age: 74
End: 2019-04-02

## 2019-04-02 ENCOUNTER — HOSPITAL ENCOUNTER (OUTPATIENT)
Dept: PHYSICAL THERAPY | Age: 74
Discharge: HOME | End: 2019-04-02
Payer: MEDICARE

## 2019-04-02 VITALS
BODY MASS INDEX: 23.67 KG/M2 | HEIGHT: 69 IN | WEIGHT: 159.8 LBS | OXYGEN SATURATION: 99 % | RESPIRATION RATE: 15 BRPM | DIASTOLIC BLOOD PRESSURE: 58 MMHG | TEMPERATURE: 97.4 F | HEART RATE: 55 BPM | SYSTOLIC BLOOD PRESSURE: 157 MMHG

## 2019-04-02 DIAGNOSIS — M51.26 HNP (HERNIATED NUCLEUS PULPOSUS), LUMBAR: Primary | ICD-10-CM

## 2019-04-02 PROCEDURE — 97032 APPL MODALITY 1+ESTIM EA 15: CPT

## 2019-04-02 PROCEDURE — 97110 THERAPEUTIC EXERCISES: CPT

## 2019-04-02 RX ORDER — GABAPENTIN 100 MG/1
100 CAPSULE ORAL 3 TIMES DAILY
Qty: 90 CAP | Refills: 1 | Status: SHIPPED | OUTPATIENT
Start: 2019-04-02

## 2019-04-02 NOTE — PATIENT INSTRUCTIONS
Herniated Disc: Care Instructions Your Care Instructions The bones that form the spine in your back are cushioned by small discs. If a disc is damaged, it may bulge or break open (herniate). A herniated disc can result from normal wear and tear as we age or from an injury or disease. If a herniated disc presses on a nerve, it can cause pain and numbness in your leg (sciatica) and/or back pain. You may be able to heal your herniated disc with a few weeks or months of rest, medicine, and exercises. In some cases, you may need surgery. Follow-up care is a key part of your treatment and safety. Be sure to make and go to all appointments, and call your doctor if you are having problems. It's also a good idea to know your test results and keep a list of the medicines you take. How can you care for yourself at home? · Take your medicines exactly as prescribed. Call your doctor if you think you are having a problem with your medicine. · Ask your doctor if you can take an over-the-counter pain medicine, such as acetaminophen (Tylenol), ibuprofen (Advil, Motrin), or naproxen (Aleve). Read and follow all instructions on the label. · Do not take two or more pain medicines at the same time unless the doctor told you to. Many pain medicines have acetaminophen, which is Tylenol. Too much acetaminophen (Tylenol) can be harmful. · Rest your back if your pain is severe. · Avoid movements and positions that increase your pain or numbness. · Try taking short walks and doing light activities that do not cause pain. Even if you are feeling some pain, it is important to keep your muscles active and strong. · Use heat or ice to relieve pain. ? To apply heat, put a warm water bottle, heating pad set on low, or warm cloth on your back. Do not go to sleep with a heating pad on your skin. ? To use ice, put ice or a cold pack on the area for 10 to 20 minutes at a time. Put a thin cloth between the ice and your skin. · Your doctor may recommend a physical therapy program, where you learn exercises to do at home. These exercises strengthen the muscles that support your lower back and prevent reinjury. · Stay at a healthy weight. This may reduce the load on your back. · Quit smoking if you smoke. If you need help quitting, talk to your doctor about stop-smoking programs and medicines. These can increase your chances of quitting for good. · To avoid hurting your back when lifting: ? Lift with your legs, not your back, by squatting and bending your knees. Avoid bending forward at the waist when lifting. ? Rise slowly. ? Keep the load as close to your body as possible, at the level of your navel. ? Avoid turning or twisting your body while holding a heavy object. ? Get help if you need to lift a heavy object. Never lift a heavy object above shoulder level. When should you call for help? Call 911 anytime you think you may need emergency care. For example, call if: 
  · You are unable to move a leg at all.  
[facility] your doctor now or seek immediate medical care if: 
  · You have new or worse symptoms in your arms, legs, chest, belly, or buttocks. Symptoms may include: 
? Numbness or tingling. ? Weakness. ? Pain.  
  · You lose bladder or bowel control.  
 Watch closely for changes in your health, and be sure to contact your doctor if: 
  · You are not getting better as expected. Where can you learn more? Go to http://spike-rosa isela.info/. Enter F534 in the search box to learn more about \"Herniated Disc: Care Instructions. \" Current as of: September 20, 2018 Content Version: 11.9 © 6997-0360 [employer]. Care instructions adapted under license by [employer] (which disclaims liability or warranty for this information).  If you have questions about a medical condition or this instruction, always ask your healthcare professional. [employer] disclaims any warranty or liability for your use of this information. Herniated Disc: Exercises Your Care Instructions Here are some examples of typical rehabilitation exercises for your condition. Start each exercise slowly. Ease off the exercise if you start to have pain. Your doctor or physical therapist will tell you when you can start these exercises and which ones will work best for you. How to stay safe These exercises can help you move easier and feel better. But when you first start doing them, you may have more pain in your back. This is normal. But it is important to pay close attention to your pain during and after each exercise. · Keep doing these exercises if your pain stays the same or moves from your leg and buttock more toward the middle of your spine. Pain moving out of your leg and buttock is a good sign. · Stop doing these exercises if your pain gets worse in your leg and buttock. Stop if you start to have pain in your leg and buttock that you didn't have before. Be sure to do these exercises in the order they appear. Note how your pain changes before you move to the next one. If your pain is much worse right after exercise and stays worse the next day, do not do any of these exercises. How to do the exercises 1. Rest on belly 1. Lie on your stomach, face down, with your head turned to the side. Place your arms beside your body. If this bothers your neck, place your hands, one on top of the other, underneath your forehead. This will help support your head and neck. 2. Try to relax your lower back muscles as much as you can. 3. Continue to lie on your stomach for 2 minutes. 4. If your pain spreads down your leg or increases down your leg, stop this exercise and do not do the next exercises. 2. Press-up 1. Lie on your stomach, face down. Keep your elbows tucked into your sides and under your shoulders. 2. Press your elbows down into the floor to raise your upper back. As you do this, relax your stomach muscles. Allow your back to arch without using your back muscles. Let your low back relax completely as you arch up. 3. Hold this position for 2 minutes. 4. Repeat 2 to 4 times. 5. If your pain spreads down your leg or increases down your leg, stop this exercise and do not do the next exercises. 3. Full press-up 1. Lie on your stomach, face down. Keep your elbows tucked into your sides and under your shoulders. 2. Straighten your elbows, and push your upper body up as far as you can. Allow your lower back to sag. Keep your hips, pelvis, and legs relaxed. 3. Hold this position for 5 seconds, and then relax. 4. Repeat 10 times. Each time, try to raise your upper body a little higher and hold your arms a bit straighter. 5. If your pain spreads down your leg or gets worse down your leg, stop this exercise and do not move to the next exercise. 6. If you can't do this exercise, you may instead try the backward bend exercise that follows. 4. Backward bend 1. Stand with your feet hip-width apart. Your toes should point forward. Do not lock your knees. 2. Place your hands in the small of your back. 3. Bend backward as far as you can, keeping your knees straight. Hold this position for 2 to 3 seconds. Then return to your starting position. 4. Repeat 2 to 4 times. Each time, try to bend backward a little farther, until you bend backward as far as you can. 5. If your pain spreads down your leg or increases down your leg, stop this exercise. Follow-up care is a key part of your treatment and safety. Be sure to make and go to all appointments, and call your doctor if you are having problems. It's also a good idea to know your test results and keep a list of the medicines you take. Where can you learn more? Go to http://spike-rosa isela.info/. Enter 58277 [id number] in the search box to learn more about \"Herniated Disc: Exercises. \" Current as of: September 20, 2018 Content Version: 11.9 © 9294-7437 [employer]. Care instructions adapted under license by [employer] (which disclaims liability or warranty for this information). If you have questions about a medical condition or this instruction, always ask your healthcare professional. [place] [address] any warranty or liability for your use of this information.

## 2019-04-02 NOTE — PROGRESS NOTES
PT DAILY TREATMENT NOTE 10-18 Patient Name: Robbie Fournier Date:2019 : 1945 [x]  Patient  Verified Payor: Anthony Denney / Plan: [facility] [employer] MEDICARE CHOICE PPO/PFFS / Product Type: Managed Care Medicare / In time:  9:31 Out time: 10:15 Total Treatment Time (min): 44 Visit #: 9 of 12 Medicare/BCBS Only Total Timed Codes (min):  44 1:1 Treatment Time:  44 Treatment Area: Low back pain [M54.5] Radiculopathy, lumbar region [M54.16] SUBJECTIVE Pain Level (0-10 scale): 4-5 Any medication changes, allergies to medications, adverse drug reactions, diagnosis change, or new procedure performed?: [x] No    [] Yes (see summary sheet for update) Subjective functional status/changes:   [] No changes reported I think I will be  Needing  The LTO  Today. OBJECTIVE Modality rationale: decrease edema, decrease inflammation, decrease pain and increase tissue extensibility to improve the patients ability to perform ADL Min Type Additional Details  
 [] Estim:  []Unatt       []IFC  []Premod []Other:  []w/ice   []w/heat Position: Location:  
10 [x] Estim: [x]Att    []TENS instruct  []NMES []Other: LTO []w/US   []w/ice   []w/heat Position:prone Location:right  glute  Region/piriformis []  Traction: [] Cervical       []Lumbar 
                     [] Prone          []Supine []Intermittent   []Continuous Lbs: 
[] before manual 
[] after manual  
 []  Ultrasound: []Continuous   [] Pulsed []1MHz   []3MHz W/cm2: 
Location:  
 []  Iontophoresis with dexamethasone Location: [] Take home patch  
[] In clinic  
 []  Ice     []  heat 
[]  Ice massage 
[]  Laser  
[]  Anodyne Position: Location:  
 []  Laser with stim 
[]  Other:  Position: Location:  
 []  Vasopneumatic Device Pressure:       [] lo [] med [] hi  
Temperature: [] lo [] med [] hi  
 [x] Skin assessment post-treatment:  [x]intact []redness- no adverse reaction 
  []redness  adverse reaction:  
 
 min []Eval                  []Re-Eval  
 
 
34 min Therapeutic Exercise:  [] See flow sheet :  
Rationale: increase ROM and increase strength to improve the patients ability to perform ADL  
 
 min Therapeutic Activity:  []  See flow sheet :  
Rationale:   to improve the patients ability to  
  
 min Neuromuscular Re-education:  []  See flow sheet :  
Rationale:   to improve the patients ability to  
 
 min Manual Therapy:    
Rationale: decrease pain, increase ROM, increase tissue extensibility and decrease edema  to perform ADL  
 
 min Gait Training:  ___ feet with ___ device on level surfaces with ___ level of assist  
Rationale: With 
 [x] TE 
 [] TA 
 [] neuro 
 [] other: Patient Education: [x] Review HEP [] Progressed/Changed HEP based on:  
[] positioning   [x] body mechanics   [] transfers   [] heat/ice application   
[] other:   
 
Other Objective/Functional Measures:  Fair response  To each there ex. Pain Level (0-10 scale) post treatment: <4 
 
ASSESSMENT/Changes in Function:Benefited  With treatment. Patient will continue to benefit from skilled PT services to address functional mobility deficits, address ROM deficits, address strength deficits, analyze and address soft tissue restrictions, analyze and cue movement patterns and instruct in home and community integration to attain remaining goals. [x]  See Plan of Care 
[]  See progress note/recertification 
[]  See Discharge Summary Progress towards goals / Updated goals: 
Short Term Goals: To be accomplished in 6 treatments: 1. Patient will decrease pain during aggravating activities by 2 points on Verbal Analog Scale (Eval: 8) to increase participation on Activities of Daily Living such as bending and stooping.   Before  Treatment  5  Post  0   3/12/19 2. Patient will demonstrate increased strength by ½ grade on MMT (Eval: 3/5) throughout core and lumbar spine to improve functional participation in such tasks as prolonged standing and sitting.  
      3.   Patient will improve sitting tolerance time by 10 min (Eval: 10 min) without increased pain.   progressing  3/26/19 
  
Long Term Goals: To be accomplished in 12 treatments: 1. Patient will decrease pain during aggravating activities by 4 points on Verbal Analog Scale (Eval: 8/10) to increase participation on Activities of Daily Living such as squatting, lifting and carrying moderate to heavy items. 2. Patient will demonstrate increased strength by 1 grade on MMT (Eval: 3/5) to improve functional participation in such tasks as standing and sitting for greater than 30 min without pain (Eval 10 min).   
      3.   Patient will achieve predicted FOTO scores to demonstrate decreased functional impairment to facilitate improved participation in activities of daily living, improve overall quality of life.  PLAN 
[]  Upgrade activities as tolerated     []  Continue plan of care 
[]  Update interventions per flow sheet      
[]  Discharge due to:_ 
[]  Other:_   
 
Mery Almaraz PTA 4/2/2019  9:45 AM 
 
Future Appointments Date Time Provider Eugenio Foreman 4/2/2019  1:30 PM Reyes Kells,  [address]  
4/4/2019  9:30 AM Ale Sanchez, PT [facility]  
4/9/2019  9:30 AM Barbi Herrera PTA [facility]  
4/11/2019  2:30 PM [facility]ielo Phillips PT [facility]  
7/1/2019  9:15 AM Ailyn Velez MD [address] [place] [address]  
1/31/2020  9:00 AM Ailyn Velez MD [address] [place] [address]

## 2019-04-02 NOTE — PROGRESS NOTES
[address] 
[address], [address] Phone: [phone] Fax: [phone] INITIAL CONSULTATION Patient: Clayton Tabares                MRN: [id number]       SSN: [id number] YOB: 1945        AGE: 68 y.o. SEX: male Body mass index is 23.6 kg/m². PCP: Barry Germain MD 
04/02/19 Chief Complaint Patient presents with  Back Pain Lumbar  Buttocks pain Right  Leg Pain Right  New Patient Surgery Consult HISTORY OF PRESENT ILLNESS, RADIOGRAPHS, and PLAN:  
 
 
 
HISTORY OF PRESENT ILLNESS:  Mr. Arsalan Capps is seen at the request of Dr. Rubin Thornton. The patient is a 73-year-old retired gentleman. He had cardiac stenting last year. He has had about 3 months of progressive pain, ongoing pain, in his right leg. It has been unresponsive to conservative treatments, therapy, and medication. MRI demonstrates an L4-L5 right foraminal disc herniation. He has a positive straight leg raise, some weakness in his quads. ASSESSMENT/PLAN:  I discussed the natural history with him and discussed his issues at length. He cannot have any interventions for at least a month or 2 until he is off anticoagulation following his coated stents. We will see him back in 4-6 weeks. We are going to try him on a low dose of gabapentin and see if he tolerates that and that helps him with his leg pain. I will see him in 4-6 weeks to monitor his progress. We may need to consider a simple laminotomy and decompression. cc:  Dr. Bernardino Pérez Past Medical History:  
Diagnosis Date  Arthritis   
 hip and back  Cardiac echocardiogram 06/24/2014 EF 65%. No WMA. Normal fdiastolic fx. Mild AI. No significant chg from study of 9/10/10.  Cardiac nuclear imaging study, normal 04/03/2009 No ischemia or prior infarction. EF 68%. No reg'l WMA.  Chronic lung disease  [facility] 11/2012  
 type II  
 Diabetes mellitus ([facility]  Dyslipidemia  Essential hypertension  Gallstones  GERD (gastroesophageal reflux disease)  Heart murmur  HTN (hypertension)  Irregular heart beat  Kidney disease  Osteopenia Family History Problem Relation Age of Onset  Heart Disease Mother   
     hx of valve surgery  Cancer Father Current Outpatient Medications Medication Sig Dispense Refill  
 HYDROcodone-acetaminophen (NORCO) 5-325 mg per tablet Take 1 Tab by mouth every eight (8) hours as needed for Pain. Max Daily Amount: 3 Tabs. 21 Tab 0  
 furosemide (LASIX) 20 mg tablet 20 mg.    
 tamsulosin (FLOMAX) 0.4 mg capsule TAKE ONE CAPSULE BY MOUTH DAILY 86 Cap 2  
 hydrALAZINE (APRESOLINE) 50 mg tablet TAKE ONE TABLET BY MOUTH TWICE A  Tab 2  
 lisinopril (PRINIVIL, ZESTRIL) 10 mg tablet Take  by mouth daily.  acetaminophen (TYLENOL EXTRA STRENGTH) 500 mg tablet Take  by mouth every six (6) hours as needed for Pain.  diphenhydrAMINE (BENADRYL) 25 mg capsule Take 25 mg by mouth every six (6) hours as needed.  ticagrelor (BRILINTA) 90 mg tablet Take 1 Tab by mouth.  carvedilol (COREG) 12.5 mg tablet Take 6.25 mg by mouth.  nitroglycerin (NITROSTAT) 0.4 mg SL tablet 0.4 mg by SubLINGual route every five (5) minutes as needed for Chest Pain. Up to 3 doses.  multivitamin (ONE A DAY) tablet Take 1 Tab by mouth daily.  rosuvastatin (CRESTOR) 20 mg tablet Take 20 mg by mouth nightly.  amLODIPine (NORVASC) 10 mg tablet TAKE 1 TABLET BY MOUTH DAILY. (Patient taking differently: 5 mg. TAKE 1 TABLET BY MOUTH DAILY. ) 90 Tab 3  
 omeprazole (PRILOSEC) 10 mg capsule Take 5 mg by mouth daily.  Cholecalciferol, Vitamin D3, (VITAMIN D3) 1,000 unit cap Take 400 Units by mouth daily.  aspirin 81 mg tablet Take 81 mg by mouth daily.  cyclobenzaprine (FLEXERIL) 10 mg tablet Take 1 Tab by mouth nightly as needed for Muscle Spasm(s). 30 Tab 2  psyllium (METAMUCIL) packet Take 1 Packet by mouth daily.  umeclidinium-vilanterol (ANORO ELLIPTA) 62.5-25 mcg/actuation inhaler Take  by inhalation.  VITAMIN A PO Take 2,100 Units by mouth daily.  fenofibric acid (TRILIPIX) 135 mg capsule Take 135 mg by mouth nightly. No Known Allergies Past Surgical History:  
Procedure Laterality Date  HX GI    
 HX HEART CATHETERIZATION  03/2018 2nd 4/2018,   
[place] [facility] RENAL ARTERY STENT Left 04/2018  HX TONSILLECTOMY  LAP,CHOLECYSTECTOMY  REMOVAL GALLBLADDER Past Medical History:  
Diagnosis Date  Arthritis   
 hip and back  Cardiac echocardiogram 06/24/2014 EF 65%. No WMA. Normal fdiastolic fx. Mild AI. No significant chg from study of 9/10/10.  Cardiac nuclear imaging study, normal 04/03/2009 No ischemia or prior infarction. EF 68%. No reg'l WMA.  Chronic lung disease  Diabetes ([facility] 11/2012  
 type II  
 Diabetes mellitus ([facility]  Dyslipidemia  Essential hypertension  Gallstones  GERD (gastroesophageal reflux disease)  Heart murmur  HTN (hypertension)  Irregular heart beat  Kidney disease  Osteopenia Social History Socioeconomic History  Marital status:  Spouse name: Not on file  Number of children: Not on file  Years of education: Not on file  Highest education level: Not on file Occupational History  Not on file Social Needs  Financial resource strain: Not on file  Food insecurity:  
  Worry: Not on file Inability: Not on file  Transportation needs:  
  Medical: Not on file Non-medical: Not on file Tobacco Use  Smoking status: Current Every Day Smoker Packs/day: 0.25 Years: 50.00 Pack years: 12.50 Types: Cigarettes  Smokeless tobacco: Never Used Substance and Sexual Activity  Alcohol use: No  
  Comment: occasionally  Drug use: No  
 Sexual activity: Never Lifestyle  Physical activity:  
  Days per week: Not on file Minutes per session: Not on file  Stress: Not on file Relationships  Social connections:  
  Talks on phone: Not on file Gets together: Not on file Attends Rastafari service: Not on file Active member of club or organization: Not on file Attends meetings of clubs or organizations: Not on file Relationship status: Not on file  Intimate partner violence:  
  Fear of current or ex partner: Not on file Emotionally abused: Not on file Physically abused: Not on file Forced sexual activity: Not on file Other Topics Concern  Not on file Social History Narrative  Not on file REVIEW OF SYSTEMS: 
 CONSTITUTIONAL SYMPTOMS:  Negative. EYES:  Negative. EARS, NOSE, THROAT AND MOUTH:  Negative. CARDIOVASCULAR:  Negative. RESPIRATORY:  Negative. GENITOURINARY: Per HPI. GASTROINTESTINAL:  Per HPI. INTEGUMENTARY (SKIN AND/OR BREAST):  Negative. MUSCULOSKELETAL: Per HPI. ENDOCRINE/RHEUMATOLOGIC:  Negative. NEUROLOGICAL:  Per HPI. HEMATOLOGIC/LYMPHATIC:  Negative. ALLERGIC/IMMUNOLOGIC:  Negative. PSYCHIATRIC:  Negative. PHYSICAL EXAMINATION:  
Visit Vitals /58 Pulse (!) 55 Temp 97.4 °F (36.3 °C) Resp 15 Ht 5' 9\" (1.753 m) Wt 159 lb 12.8 oz (72.5 kg) SpO2 99% BMI 23.60 kg/m² PAIN SCALE: 4/10 CONSTITUTIONAL: The patient is in no apparent distress and is alert and oriented x 3. HEENT: Normocephalic. Hearing grossly intact. NECK: Supple and symmetric. no tenderness, or masses were felt. RESPIRATORY: No labored breathing. CARDIOVASCULAR: The carotid pulses were normal. Peripheral pulses were 2+. CHEST: Normal AP diameter and normal contour without any kyphoscoliosis. LYMPHATIC: No lymphadenopathy was appreciated in the neck, axillae or groin. SKIN:  Negative for scars, rashes, lesions, or ulcers on the right upper, right lower, left upper, left lower and trunk. NEUROLOGICAL: Alert and oriented x 3. Ambulation without assistive device. FWB. EXTREMITIES:  See musculoskeletal. 
MUSCULOSKELETAL: 
? Head and Neck:  Negative for misalignment, asymmetry, crepitation, defects, tenderness masses or effusions. ? Left Upper Extremity: Inspection, percussion and palpation performed. Vaughans sign is negative. ? Right Upper Extremity: Inspection, percussion and palpation performed. Vaughans sign is negative. ? Spine, Ribs and Pelvis: Back and RLE pain. Inspection, percussion and palpation performed. Negative for misalignment, asymmetry, crepitation, defects, tenderness masses or effusions. ? Left Lower Extremity: Inspection, percussion and palpation performed. Negative straight leg raise. ? Right Lower Extremity: Pain. Inspection, percussion and palpation performed. Positive straight leg raise. SPINE EXAM:  
 
Cervical spine: Neck is midline. Normal muscle tone. No focal atrophy is noted. Lumbar spine: No rash, ecchymosis, or gross obliquity. No focal atrophy is noted. ASSESSMENT 
  ICD-10-CM ICD-9-CM 1. HNP (herniated nucleus pulposus), lumbar M51.26 722.10 gabapentin (NEURONTIN) 100 mg capsule Written by Amor Justice, as dictated by Josh Esteban MD. 
 
I, Dr. Josh Esteban MD, confirm that all documentation is accurate.

## 2019-04-04 ENCOUNTER — APPOINTMENT (OUTPATIENT)
Dept: PHYSICAL THERAPY | Age: 74
End: 2019-04-04
Payer: MEDICARE

## 2019-04-09 ENCOUNTER — APPOINTMENT (OUTPATIENT)
Dept: PHYSICAL THERAPY | Age: 74
End: 2019-04-09
Payer: MEDICARE

## 2019-04-11 ENCOUNTER — APPOINTMENT (OUTPATIENT)
Dept: PHYSICAL THERAPY | Age: 74
End: 2019-04-11
Payer: MEDICARE

## 2019-07-01 ENCOUNTER — OFFICE VISIT (OUTPATIENT)
Dept: UROLOGY | Age: 74
End: 2019-07-01

## 2019-07-01 VITALS
BODY MASS INDEX: 23.11 KG/M2 | HEIGHT: 69 IN | SYSTOLIC BLOOD PRESSURE: 119 MMHG | WEIGHT: 156 LBS | DIASTOLIC BLOOD PRESSURE: 48 MMHG | OXYGEN SATURATION: 94 % | HEART RATE: 50 BPM

## 2019-07-01 DIAGNOSIS — N40.0 BPH WITHOUT OBSTRUCTION/LOWER URINARY TRACT SYMPTOMS: ICD-10-CM

## 2019-07-01 DIAGNOSIS — N40.2 PROSTATE NODULE: Primary | ICD-10-CM

## 2019-07-01 RX ORDER — SODIUM BICARBONATE 650 MG/1
TABLET ORAL 4 TIMES DAILY
COMMUNITY

## 2019-07-01 RX ORDER — DUTASTERIDE 0.5 MG/1
0.5 CAPSULE, LIQUID FILLED ORAL DAILY
Qty: 90 CAP | Refills: 3 | Status: SHIPPED | OUTPATIENT
Start: 2019-07-01 | End: 2020-03-19 | Stop reason: SDUPTHER

## 2019-07-01 NOTE — PATIENT INSTRUCTIONS
Benign Prostatic Hyperplasia: Care Instructions Your Care Instructions Benign prostatic hyperplasia, or BPH, is an enlarged prostate gland. The prostate is a small gland that makes some of the fluid in semen. Prostate enlargement happens to almost all men as they age. It is usually not serious. BPH does not cause prostate cancer. As the prostate gets bigger, it may partly block the flow of urine. You may have a hard time getting a urine stream started or completely stopped. BPH can cause dribbling. You may have a weak urine stream, or you may have to urinate more often than you used to, especially at night. Most men find these problems easy to manage. You do not need treatment unless your symptoms bother you a lot or you have other problems, such as bladder infections or stones. In these cases, medicines may help. Surgery is not needed unless the urine flow is blocked or the symptoms do not get better with medicine. Follow-up care is a key part of your treatment and safety. Be sure to make and go to all appointments, and call your doctor if you are having problems. It's also a good idea to know your test results and keep a list of the medicines you take. How can you care for yourself at home? · Take plenty of time to urinate. Try to relax. · Try \"double voiding. \" Urinate as much you can, relax for a few moments, and then try to urinate again. · Sit on the toilet to urinate. · Read or think of other things while you are waiting. · Turn on a faucet, or try to picture running water. Some men find that this helps get their urine flowing. · If dribbling is a problem, wash your penis daily to avoid skin irritation and infection. · Avoid caffeine and alcohol. These drinks will increase how often you need to urinate. Spread your fluid intake throughout the day. If the urge to urinate often wakes you at night, limit your fluid intake in the evening. Urinate right before you go to bed. · Many over-the-counter cold and allergy medicines can make the symptoms of BPH worse. Avoid antihistamines, decongestants, and allergy pills, if you can. Read the warnings on the package. · If you take any prescription medicines, especially tranquilizers or antidepressants, ask your doctor or pharmacist whether they can cause urination problems. There may be other medicines you can use that do not cause urinary problems. · Be safe with medicines. Take your medicines exactly as prescribed. Call your doctor if you think you are having a problem with your medicine. When should you call for help? Call your doctor now or seek immediate medical care if: 
  · You cannot urinate at all.  
  · You have symptoms of a urinary infection. For example: ? You have blood or pus in your urine. ? You have pain in your back just below your rib cage. This is called flank pain. ? You have a fever, chills, or body aches. ? It hurts to urinate. ? You have groin or belly pain.  
 Watch closely for changes in your health, and be sure to contact your doctor if: 
  · It hurts when you ejaculate.  
  · Your urinary problems get a lot worse or bother you a lot. Where can you learn more? Go to http://spike-rosa isela.info/. Enter B589 in the search box to learn more about \"Benign Prostatic Hyperplasia: Care Instructions. \" Current as of: September 26, 2018 Content Version: 11.9 © 0218-3653 [employer] Care instructions adapted under license by [employer] (which disclaims liability or warranty for this information). If you have questions about a medical condition or this instruction, always ask your healthcare professional. [address] any warranty or liability for your use of this information.

## 2019-07-01 NOTE — PROGRESS NOTES
Chief Complaint   Patient presents with    Prostate Nodule    Benign Prostatic Hypertrophy       HISTORY OF PRESENT ILLNESS:  Rashel Johnson is a 69 y.o. male who presents today for repeat STEVAN because of a previous suspicious examination. He has bladder outlet obstructive symptoms and  nocturia x2-3. His PSA has remained stable as indicated in the chart below. I brought him back in today to reexamine his prostate. We also talked further about the initiation of the use of finasteride or Avodart. ROS documented on the chart    Past Medical History:   Diagnosis Date    Arthritis     hip and back    Basal cell carcinoma     Cardiac echocardiogram 06/24/2014    EF 65%. No WMA. Normal fdiastolic fx. Mild AI. No significant chg from study of 9/10/10.  Cardiac nuclear imaging study, normal 04/03/2009    No ischemia or prior infarction. EF 68%. No reg'l WMA.       Chronic lung disease     Diabetes ([address] 11/2012    type II    Diabetes mellitus ([address]     Dyslipidemia     Essential hypertension     Gallstones     GERD (gastroesophageal reflux disease)     Heart murmur     HTN (hypertension)     Irregular heart beat     Kidney disease     Melanoma ([address]     Osteopenia        Past Surgical History:   Procedure Laterality Date    HX GI      HX HEART CATHETERIZATION  03/2018    2nd 4/2018,     HX RENAL ARTERY STENT Left 04/2018    HX TONSILLECTOMY      LAP,CHOLECYSTECTOMY      REMOVAL GALLBLADDER         Social History     Tobacco Use    Smoking status: Current Every Day Smoker     Packs/day: 0.25     Years: 50.00     Pack years: 12.50     Types: Cigarettes    Smokeless tobacco: Never Used   Substance Use Topics    Alcohol use: No     Comment: occasionally     Drug use: No       No Known Allergies    Family History   Problem Relation Age of Onset    Heart Disease Mother         hx of valve surgery    Cancer Father        Current Outpatient Medications   Medication Sig Dispense Refill    sodium bicarbonate 650 mg tablet Take  by mouth four (4) times daily.  furosemide (LASIX) 20 mg tablet 20 mg.      tamsulosin (FLOMAX) 0.4 mg capsule TAKE ONE CAPSULE BY MOUTH DAILY 86 Cap 2    hydrALAZINE (APRESOLINE) 50 mg tablet TAKE ONE TABLET BY MOUTH TWICE A  Tab 2    lisinopril (PRINIVIL, ZESTRIL) 10 mg tablet Take  by mouth daily.  acetaminophen (TYLENOL EXTRA STRENGTH) 500 mg tablet Take  by mouth every six (6) hours as needed for Pain.  diphenhydrAMINE (BENADRYL) 25 mg capsule Take 25 mg by mouth every six (6) hours as needed.  carvedilol (COREG) 12.5 mg tablet Take 6.25 mg by mouth.  nitroglycerin (NITROSTAT) 0.4 mg SL tablet 0.4 mg by SubLINGual route every five (5) minutes as needed for Chest Pain. Up to 3 doses.  multivitamin (ONE A DAY) tablet Take 1 Tab by mouth daily.  rosuvastatin (CRESTOR) 20 mg tablet Take 20 mg by mouth nightly.  amLODIPine (NORVASC) 10 mg tablet TAKE 1 TABLET BY MOUTH DAILY. (Patient taking differently: 5 mg. TAKE 1 TABLET BY MOUTH DAILY. ) 90 Tab 3    Cholecalciferol, Vitamin D3, (VITAMIN D3) 1,000 unit cap Take 400 Units by mouth daily.  aspirin 81 mg tablet Take 81 mg by mouth daily.  gabapentin (NEURONTIN) 100 mg capsule Take 1 Cap by mouth three (3) times daily. 90 Cap 1    cyclobenzaprine (FLEXERIL) 10 mg tablet Take 1 Tab by mouth nightly as needed for Muscle Spasm(s). 30 Tab 2    HYDROcodone-acetaminophen (NORCO) 5-325 mg per tablet Take 1 Tab by mouth every eight (8) hours as needed for Pain. Max Daily Amount: 3 Tabs. 21 Tab 0    psyllium (METAMUCIL) packet Take 1 Packet by mouth daily.  umeclidinium-vilanterol (ANORO ELLIPTA) 62.5-25 mcg/actuation inhaler Take  by inhalation.  ticagrelor (BRILINTA) 90 mg tablet Take 1 Tab by mouth.  omeprazole (PRILOSEC) 10 mg capsule Take 5 mg by mouth daily.  VITAMIN A PO Take 2,100 Units by mouth daily.       fenofibric acid (TRILIPIX) 135 mg capsule Take 135 mg by mouth nightly. Lab Results   Component Value Date/Time    Prostate Specific Ag 3.0 01/11/2019 09:10 AM    Prostate Specific Ag 2.5 01/12/2018 09:48 AM    Prostate Specific Ag 2.0 10/30/2010 09:15 AM              PHYSICAL EXAMINATION:   Visit Vitals  /48 (BP 1 Location: Left arm, BP Patient Position: Sitting)   Pulse (!) 50   Ht 5' 9\" (1.753 m)   Wt 156 lb (70.8 kg)   SpO2 94%   BMI 23.04 kg/m²     Constitutional: WDWN, Pleasant and appropriate affect, No acute distress. CV:  No peripheral swelling noted  Respiratory: No respiratory distress or difficulties  Abdomen:  No abdominal masses or tenderness. No CVA tenderness. No inguinal hernias noted.  Male:    STEVAN:Perineum normal to visual inspection, no erythema or irritation, his prostate is mildly enlarged at about 30 to 40 g. I do not really feel a distinct nodular area or indurated area. The right lobe of the prostate is clearly a little larger than the left lobe and is softer but there are no obvious abnormalities on that side on the left side again I do not really feel any nodularity that I think would warrant a biopsy at this time. SCROTUM:  No scrotal rash or lesions noticed. Normal bilateral testes and epididymis. PENIS: Urethral meatus normal in location and size. No urethral discharge. Skin: No jaundice. Neuro/Psych:  Alert and oriented x 3, affect appropriate. Lymphatic:   No enlarged inguinal lymph nodes.          Results for orders placed or performed in visit on 07/01/19   AMB POC URINALYSIS DIP STICK AUTO W/O MICRO   Result Value Ref Range    Color (UA POC) Yellow     Clarity (UA POC) Clear     Glucose (UA POC) Negative Negative    Bilirubin (UA POC) Negative Negative    Ketones (UA POC) Negative Negative    Specific gravity (UA POC) 1.025 1.001 - 1.035    Blood (UA POC) Negative Negative    pH (UA POC) 5.0 4.6 - 8.0    Protein (UA POC) 2+ Negative    Urobilinogen (UA POC) 0.2 mg/dL 0.2 - 1    Nitrites (UA POC) Negative Negative    Leukocyte esterase (UA POC) Negative Negative         REVIEW OF LABS AND IMAGING:     Imaging Report Reviewed? NO     Images Reviewed? NO           Other Lab Data Reviewed? YES         ASSESSMENT:     ICD-10-CM ICD-9-CM    1. Prostate nodule N40.2 600.10 AMB POC URINALYSIS DIP STICK AUTO W/O MICRO   2. BPH without obstruction/lower urinary tract symptoms N40.0 600.00             PLAN / DISCUSSION: : I think his prostate is probably stable. I will go ahead and start him on dutasteride and see him back in 6 months with a repeat PSA and STEVAN. The patient expresses understanding and agreement of the discussion and plan. Saloni Collins MD on 7/1/2019         Please note:  Voice recognition was used to generate this report, which may have resulted in some phonetic based errors in grammar and contents. Even though attempts were made to correct all the mistakes, some may have been missed, and remained in the body of the document.

## 2019-07-01 NOTE — PROGRESS NOTES
Mr. Brian Nuñez has a reminder for a \"due or due soon\" health maintenance. I have asked that he contact his primary care provider for follow-up on this health maintenance.

## 2019-07-29 NOTE — PROGRESS NOTES
[facility]      [address] [facility], [address] [facility], [address],[place] [address]  [phone] fax    Discharge Summary    Patient name: Mitzi Bonilla Start of Care: 3/4/2019   Referral source: Adina Galloway : 1945                Medical Diagnosis: Low back pain [M54.5]  Radiculopathy, lumbar region [M54.16]  Payor: Angel Gallagher / Plan: [id number] [facility] PPO/PFFS / Product Type: Managed Care Medicare /  Onset Date:2019                Treatment Diagnosis: Low back pain, joint stiffness, muscle weakness, gait abnormalities   Prior Hospitalization: see medical history Provider#: [id number]   Medications: Verified on Patient summary List    Comorbidities: Cardiac stent x3, Renal Stent 2018. Two of these stents went through femoral artery, HTN medications   Prior Level of Function: As of 2018 was able to bend over and tie shoes without sitting down without pain; was going to the  5-6 days per week doing the highest level of the [employer] program as well as \"Aquacize\" and intermittently walking 30-45 min. Visits from Start of Care: 9    Missed Visits: 3  Reporting Period : 3/4/19 to 19    Summary of Care:  Short Term Goals: To be accomplished in 6 treatments:  1. Patient will decrease pain during aggravating activities by 2 points on Verbal Analog Scale (Eval: 8) to increase participation on Activities of Daily Living such as bending and stooping. 2. Patient will demonstrate increased strength by ½ grade on MMT (Eval: 3/5) throughout core and lumbar spine to improve functional participation in such tasks as prolonged standing and sitting.         3.   Patient will improve sitting tolerance time by 10 min (Eval: 10 min) without increased pain.       Long Term Goals: To be accomplished in 12 treatments:  1.  Patient will decrease pain during aggravating activities by 4 points on Verbal Analog Scale (Eval: 8/10) to increase participation on Activities of Daily Living such as squatting, lifting and carrying moderate to heavy items. 2. Patient will demonstrate increased strength by 1 grade on MMT (Eval: 3/5) to improve functional participation in such tasks as standing and sitting for greater than 30 min without pain (Eval 10 min).           3.   Patient will achieve predicted FOTO scores to demonstrate decreased functional impairment to facilitate improved participation in activities of daily living, improve overall quality of life.      ASSESSMENT/RECOMMENDATIONS:  []Discontinue therapy progressing towards or have reached established goals  [x]Discontinue therapy due to lack of appreciable progress towards goals  [x]Discontinue therapy due to lack of attendance or compliance  []Other: patient stated physician wants to do surgery    Thank you for this referral.     Jeff White, PT 7/29/2019 3:15 PM

## 2019-08-12 DIAGNOSIS — R35.1 NOCTURIA: ICD-10-CM

## 2019-08-12 DIAGNOSIS — R35.0 FREQUENT URINATION: ICD-10-CM

## 2019-08-16 RX ORDER — TAMSULOSIN HYDROCHLORIDE 0.4 MG/1
CAPSULE ORAL
Qty: 72 CAP | Refills: 1 | Status: SHIPPED | OUTPATIENT
Start: 2019-08-16 | End: 2020-03-23

## 2023-01-06 NOTE — PERIOP NOTES
PRE-SURGICAL INSTRUCTIONS        Patient's Name:  Agustin Freeman      Today's Date:  1/6/2023    Surgery Date:  1/19/2023                Do NOT eat or drink anything, including candy, gum, or ice chips after midnight on 1/19/23, unless you have specific instructions from your surgeon or anesthesia provider to do so. You may brush your teeth before coming to the hospital.  No smoking 24 hours prior to the day of surgery. No alcohol 24 hours prior to the day of surgery. No recreational drugs for one week prior to the day of surgery. Leave all valuables, including money/purse, at home. Remove all jewelry, nail polish, acrylic nails, and makeup (including mascara); no lotions powders, deodorant, or perfume/cologne/after shave on the skin. Follow instruction for Hibiclens washes and CHG wipes from surgeon's office. Glasses/contact lenses and dentures may be worn to the hospital.  They will be removed prior to surgery. Call your doctor if symptoms of a cold or illness develop within 24-48 hours prior to your surgery. 11.  If you are having an outpatient procedure, please make arrangements for a responsible ADULT TO [address] [place] [address] and stay with you for 24 hours after your surgery. 12. ONE VISITOR in the hospital at this time for outpatient procedures. Exceptions may be made for surgical admissions, per nursing unit guidelines      Special Instructions:      Bring list of CURRENT medications. Bring any pertinent legal medical records. Take these medications the morning of surgery with a sip of water:  as instructed by office      On the day of surgery, come in the main entrance of [facility]. Let the  at the desk know you are there for surgery. A staff member will come escort you to the surgical area on the second floor.     If you have any questions or concerns, please do not hesitate to call:     (Prior to the day of surgery) PAT department: [phone]   (Day of surgery) Pre-Op department:  [phone]    These surgical instructions were reviewed with 1/19/23 during the PAT phone call.

## 2023-01-18 ENCOUNTER — ANESTHESIA EVENT (OUTPATIENT)
Dept: ENDOSCOPY | Age: 78
End: 2023-01-18
Payer: MEDICARE

## 2023-01-19 ENCOUNTER — ANESTHESIA (OUTPATIENT)
Dept: ENDOSCOPY | Age: 78
End: 2023-01-19
Payer: MEDICARE

## 2023-01-19 ENCOUNTER — HOSPITAL ENCOUNTER (OUTPATIENT)
Age: 78
Setting detail: OUTPATIENT SURGERY
Discharge: HOME | End: 2023-01-19
Attending: INTERNAL MEDICINE | Admitting: INTERNAL MEDICINE
Payer: MEDICARE

## 2023-01-19 VITALS
BODY MASS INDEX: 26.27 KG/M2 | WEIGHT: 173.3 LBS | OXYGEN SATURATION: 95 % | HEART RATE: 64 BPM | TEMPERATURE: 97.8 F | RESPIRATION RATE: 16 BRPM | SYSTOLIC BLOOD PRESSURE: 144 MMHG | DIASTOLIC BLOOD PRESSURE: 52 MMHG | HEIGHT: 68 IN

## 2023-01-19 PROCEDURE — 74011250636 HC RX REV CODE- 250/636: Performed by: NURSE ANESTHETIST, CERTIFIED REGISTERED

## 2023-01-19 PROCEDURE — 2709999900 HC NON-CHARGEABLE SUPPLY: Performed by: INTERNAL MEDICINE

## 2023-01-19 PROCEDURE — 77030008565 HC TBNG SUC IRR ERBE -B: Performed by: INTERNAL MEDICINE

## 2023-01-19 PROCEDURE — 76040000019: Performed by: INTERNAL MEDICINE

## 2023-01-19 PROCEDURE — 74011000250 HC RX REV CODE- 250: Performed by: NURSE ANESTHETIST, CERTIFIED REGISTERED

## 2023-01-19 PROCEDURE — 76060000031 HC ANESTHESIA FIRST 0.5 HR: Performed by: INTERNAL MEDICINE

## 2023-01-19 PROCEDURE — 88305 TISSUE EXAM BY PATHOLOGIST: CPT

## 2023-01-19 RX ORDER — LIDOCAINE HYDROCHLORIDE 20 MG/ML
INJECTION, SOLUTION EPIDURAL; INFILTRATION; INTRACAUDAL; PERINEURAL AS NEEDED
Status: DISCONTINUED | OUTPATIENT
Start: 2023-01-19 | End: 2023-01-19 | Stop reason: HOSPADM

## 2023-01-19 RX ORDER — SODIUM CHLORIDE, SODIUM LACTATE, POTASSIUM CHLORIDE, CALCIUM CHLORIDE 600; 310; 30; 20 MG/100ML; MG/100ML; MG/100ML; MG/100ML
50 INJECTION, SOLUTION INTRAVENOUS CONTINUOUS
Status: DISCONTINUED | OUTPATIENT
Start: 2023-01-19 | End: 2023-01-19 | Stop reason: HOSPADM

## 2023-01-19 RX ORDER — PROPOFOL 10 MG/ML
INJECTION, EMULSION INTRAVENOUS AS NEEDED
Status: DISCONTINUED | OUTPATIENT
Start: 2023-01-19 | End: 2023-01-19 | Stop reason: HOSPADM

## 2023-01-19 RX ORDER — LIDOCAINE HYDROCHLORIDE 10 MG/ML
0.1 INJECTION, SOLUTION EPIDURAL; INFILTRATION; INTRACAUDAL; PERINEURAL AS NEEDED
Status: DISCONTINUED | OUTPATIENT
Start: 2023-01-19 | End: 2023-01-19 | Stop reason: HOSPADM

## 2023-01-19 RX ADMIN — PROPOFOL 100 MG: 10 INJECTION, EMULSION INTRAVENOUS at 10:26

## 2023-01-19 RX ADMIN — SODIUM CHLORIDE, POTASSIUM CHLORIDE, SODIUM LACTATE AND CALCIUM CHLORIDE 50 ML/HR: 600; 310; 30; 20 INJECTION, SOLUTION INTRAVENOUS at 09:48

## 2023-01-19 RX ADMIN — FAMOTIDINE 20 MG: 10 INJECTION, SOLUTION INTRAVENOUS at 09:50

## 2023-01-19 RX ADMIN — LIDOCAINE HYDROCHLORIDE 50 MG: 20 INJECTION, SOLUTION EPIDURAL; INFILTRATION; INTRACAUDAL; PERINEURAL at 10:26

## 2023-01-19 NOTE — H&P
[facility]  [phone]      History and Physical    Patient: Jerardo Huntley MRN: [id number]  SSN: [id number]    YOB: 1945  Age: 69 y.o. Sex: male      Subjective:      Jerardo Huntley is a 69 y.o. male who 12/14/2022: He comes back for follow-up of odynophagia. He was seen at [facility] on October 31, 2022 for odynophagia after his right partial nephrectomy. He underwent an upper endoscopy which showed LA grade D esophagitis. He has been on Prilosec 40 mg twice daily since and. He was taking Magic mouthwash before eating and that has helped. He is doing much better he is not having the burning or pain during swallowing. He does have 2 take small bites and chew food well before he eats that he he does not feel like it gets stuck. Denies any nausea, vomiting or blood in the stool or black tarry stool. He does take fiber daily with that his constipation is better. He had virtually lost weight during hospitalization but has gained most of that back. Does have a history of chronic kidney disease and has been on a partial vegetarian diet for the last several years. He was a heavy drinker quit drinking about 10 years ago. Also was a smoker up until November 2022. consult note from [facility] dated 10/31/2022: 1. Odynophagia with stabbing midsternal CP with any oral intake following n/v for 2 days after his surgery  2. RCC s/p right partial nephrectomy on 10/24/22  3. Acute hypoxic respiratory failure  4. H/o GERD for 20 years, quit taking PPI 5 years ago  5. CKD-was on Sodium bicarb for acidosis  Covid status: vaccinated  Previous GI evaluations: Had EGD >5 years ago- unable to locate records, they were unsure of Physician name  Colonoscopy 7 years ago in [place]-normal per patient.      Past Medical History:   Diagnosis Date    Aortic valve stenosis     Arthritis     hip and back    Basal cell carcinoma     CAD (coronary artery disease)     stents 2018    Cardiac echocardiogram 2014    EF 65%. No WMA. Normal fdiastolic fx. Mild AI. No significant chg from study of 9/10/10. Cardiac nuclear imaging study, normal 2009    No ischemia or prior infarction. EF 68%. No reg'l WMA. Chronic lung disease     Chronic obstructive pulmonary disease (HCC)     Dyslipidemia     Essential hypertension     Gallstones     GERD (gastroesophageal reflux disease)     Heart murmur     HTN (hypertension)     Irregular heart beat     Kidney disease     Melanoma ([address]     Osteopenia     Renal cancer, right ([address] 10/2022     Past Surgical History:   Procedure Laterality Date    HX CATARACT REMOVAL Bilateral     HX CORONARY STENT PLACEMENT N/A     HX GI      HX HEART CATHETERIZATION  2018    2nd 2018,     HX NEPHRECTOMY Right     partial    HX RENAL ARTERY STENT Left 2018    HX TONSILLECTOMY      AL CHOLECYSTECTOMY      AL LAPAROSCOPY SURG CHOLECYSTECTOMY        Family History   Problem Relation Age of Onset    Heart Disease Mother         hx of valve surgery    Cancer Father      Social History     Tobacco Use    Smoking status: Former     Packs/day: 0.25     Years: 50.00     Pack years: 12.50     Types: Cigarettes     Quit date: 10/24/2022     Years since quittin.2    Smokeless tobacco: Never   Substance Use Topics    Alcohol use: No      Prior to Admission medications    Medication Sig Start Date End Date Taking? Authorizing Provider   cetirizine (ZYRTEC) 10 mg tablet Take 10 mg by mouth daily as needed. Yes Provider, Historical   docusate sodium (COLACE) 100 mg capsule Take 100 mg by mouth two (2) times daily as needed. 10/25/22  Yes Provider, Historical   tamsulosin (FLOMAX) 0.4 mg capsule Take 1 Capsule by mouth daily. 3/31/22  Yes OFE Wang   dutasteride (AVODART) 0.5 mg capsule Take 1 Capsule by mouth daily. 3/31/22  Yes OFE Wang   losartan (COZAAR) 50 mg tablet 25 mg daily.  3/18/20  Yes Provider, Historical   isosorbide mononitrate ER (IMDUR) 30 mg tablet Take 30 mg by mouth three (3) times daily. 2/3/20  Yes Provider, Historical   fluticasone propionate (FLONASE) 50 mcg/actuation nasal spray 1 Spray daily. Yes Provider, Historical   sodium bicarbonate 650 mg tablet Take  by mouth four (4) times daily. Yes Provider, Historical   furosemide (LASIX) 20 mg tablet 20 mg as needed. 1/3/19  Yes Provider, Historical   hydrALAZINE (APRESOLINE) 50 mg tablet TAKE ONE TABLET BY MOUTH TWICE A DAY  Patient taking differently: 100 mg three (3) times daily. 11/19/18  Yes Priti West NP   acetaminophen (TYLENOL) 500 mg tablet Take  by mouth every six (6) hours as needed for Pain. Yes Provider, Historical   diphenhydrAMINE (BENADRYL) 25 mg capsule Take 25 mg by mouth every six (6) hours as needed. Yes Provider, Historical   nitroglycerin (NITROSTAT) 0.4 mg SL tablet 0.4 mg by SubLINGual route every five (5) minutes as needed for Chest Pain. Up to 3 doses. Yes Provider, Historical   multivitamin (ONE A DAY) tablet Take 1 Tab by mouth daily. Yes Provider, Historical   rosuvastatin (CRESTOR) 20 mg tablet Take 20 mg by mouth nightly. Yes Provider, Historical   amLODIPine (NORVASC) 10 mg tablet TAKE 1 TABLET BY MOUTH DAILY. Patient taking differently: 10 mg daily. TAKE 1 TABLET BY MOUTH DAILY. 9/25/17  Yes Mallory CHÁVEZ NP   omeprazole (PRILOSEC) 40 mg capsule Take 5 mg by mouth two (2) times a day. Yes Provider, Historical   cholecalciferol (VITAMIN D3) 25 mcg (1,000 unit) cap Take 400 Units by mouth daily. Yes Provider, Historical        Allergies   Allergen Reactions    Carvedilol Anaphylaxis and Other (comments)     Bradycardia  Bradycardia         Review of Systems:  A comprehensive review of systems was negative except for that written in the History of Present Illness.     Objective:     Vitals:    01/06/23 0844   Weight: 74.8 kg (165 lb)   Height: 5' 8\" (1.727 m)        Physical Exam:  GENERAL: alert, cooperative, no distress, appears stated age  LUNG: clear to auscultation bilaterally  HEART: regular rate and rhythm, S1, S2 normal, no murmur, click, rub or gallop  ABDOMEN: soft, non-tender. Bowel sounds normal. No masses,  no organomegaly  NEUROLOGIC: alert & oriented x 3    Assessment:     Grade D esophagitis    Plan:     EGD    Signed By: Fernanda Babb MD     January 19, 2023      Fernanda Babb MD  [facility] [address] [phone]  www. [facility]

## 2023-01-19 NOTE — DISCHARGE INSTRUCTIONS
Upper GI Endoscopy: What to Expect at [address] had an upper GI endoscopy. Your doctor used a thin, lighted tube that bends to look at the inside of your esophagus, your stomach, and the first part of the small intestine, called the duodenum. After you have an endoscopy, you will stay at the hospital or clinic for 1 to 2 hours. This will allow the medicine to wear off. You will be able to go home after your doctor or nurse checks to make sure that you're not having any problems. You may have to stay overnight if you had treatment during the test. You may have a sore throat for a day or two after the test.  This care sheet gives you a general idea about what to expect after the test.  How can you care for yourself at home? Activity   Rest as much as you need to after you go home. You should be able to go back to your usual activities the day after the test.  Diet   Follow your doctor's directions for eating after the test.  Drink plenty of fluids (unless your doctor has told you not to). Medications   If you have a sore throat the day after the test, use an over-the-counter spray to numb your throat. Follow-up care is a key part of your treatment and safety. Be sure to make and go to all appointments, and call your doctor if you are having problems. It's also a good idea to know your test results and keep a list of the medicines you take. When should you call for help? Call 911 anytime you think you may need emergency care. For example, call if:    You passed out (lost consciousness). You have trouble breathing. You pass maroon or bloody stools. Call your doctor now or seek immediate medical care if:    You have pain that does not get better after your take pain medicine. You have new or worse belly pain. You have blood in your stools. You are sick to your stomach and cannot keep fluids down. You have a fever. You cannot pass stools or gas.    Watch closely for changes in your health, and be sure to contact your doctor if:    Your throat still hurts after a day or two. You do not get better as expected. Where can you learn more? Go to http://www.moon.com/  Enter J454 in the search box to learn more about \"Upper GI Endoscopy: What to Expect at Home. \"  Current as of: September 8, 2021               Content Version: 13.2  © 2006-2022 [employer]. Care instructions adapted under license by [employer] (which disclaims liability or warranty for this information). If you have questions about a medical condition or this instruction, always ask your healthcare professional. [place] [address] any warranty or liability for your use of this information. Hiatal Hernia: Care Instructions  Your Care Instructions  A hiatal hernia occurs when part of the stomach bulges into the chest cavity. A hiatal hernia may allow stomach acid and juices to back up into the esophagus (acid reflux). This can cause a feeling of burning, warmth, heat, or pain behind the breastbone. This feeling may often occur after you eat, soon after you lie down, or when you bend forward, and it may come and go. You also may have a sour taste in your mouth. These symptoms are commonly known as heartburn or reflux. But not all hiatal hernias cause symptoms. Follow-up care is a key part of your treatment and safety. Be sure to make and go to all appointments, and call your doctor if you are having problems. It's also a good idea to know your test results and keep a list of the medicines you take. How can you care for yourself at home? Take your medicines exactly as prescribed. Call your doctor if you think you are having a problem with your medicine. Do not take aspirin or other nonsteroidal anti-inflammatory drugs (NSAIDs), such as ibuprofen (Advil, Motrin) or naproxen (Aleve), unless your doctor says it is okay.  Ask your doctor what you can take for pain. Your doctor may recommend over-the-counter medicine. For mild or occasional indigestion, antacids such as Tums, Gaviscon, Maalox, or Mylanta may help. Your doctor also may recommend over-the-counter acid reducers, such as famotidine (Pepcid AC), cimetidine (Tagamet HB), or omeprazole (Prilosec). Read and follow all instructions on the label. If you use these medicines often, talk with your doctor. Change your eating habits. It's best to eat several small meals instead of two or three large meals. After you eat, wait 2 to 3 hours before you lie down. Late-night snacks aren't a good idea. Avoid foods that make your symptoms worse. These may include chocolate, mint, alcohol, pepper, spicy foods, high-fat foods, or drinks with caffeine in them, such as tea, coffee, brianne, or energy drinks. If your symptoms are worse after you eat a certain food, you may want to stop eating it to see if your symptoms get better. Do not smoke or chew tobacco.  If you get heartburn at night, raise the head of your bed 6 to 8 inches by putting the frame on blocks or placing a foam wedge under the head of your mattress. (Adding extra pillows does not work.)  Do not wear tight clothing around your middle. Lose weight if you need to. Losing just 5 to 10 pounds can help. When should you call for help? Call your doctor now or seek immediate medical care if:    You have new or worse belly pain. You are vomiting. Watch closely for changes in your health, and be sure to contact your doctor if:    You have new or worse symptoms of indigestion. You have trouble or pain swallowing. You are losing weight. You do not get better as expected. Where can you learn more? Go to http://www.AllSchoolStuff.com.com/  Enter T074 in the search box to learn more about \"Hiatal Hernia: Care Instructions. \"  Current as of: June 6, 2022               Content Version: 13.4  © 7253-5814 [employer]. Care instructions adapted under license by [employer] (which disclaims liability or warranty for this information). If you have questions about a medical condition or this instruction, always ask your healthcare professional. [address] any warranty or liability for your use of this information. DISCHARGE SUMMARY from Nurse     POST-PROCEDURE INSTRUCTIONS:    Call your Physician if you:  Observe any excess bleeding. Develop a temperature over 100.5o F. Experience abdominal, shoulder or chest pain. Notice any signs of decreased circulation or nerve impairment to an extremity such as a change in color, persistent numbness, tingling, coldness or increase in pain. Vomit blood or you have nausea and vomiting lasting longer than 4 hours. Are unable to take medications. Are unable to urinate within 8 hours after discharge following general anesthesia or intravenous sedation. For the next 24 hours after receiving general anesthesia or intravenous sedation, or while taking prescription Narcotics, limit your activities:  Do NOT drive a motor vehicle, operate hazard machinery or power tools, or perform tasks that require coordination. The medication you received during your procedure may have some effect on your mental awareness. Do NOT make important personal or business decisions. The medication you received during your procedure may have some effect on your mental awareness. Do NOT drink alcoholic beverages. These drinks do not mix well with the medications that have been given to you. Upon discharge from the hospital, you must be accompanied by a responsible adult. Resume your diet as directed by your physician. Resume medications as your physician has prescribed. Please give a list of your current medications to your Primary Care Provider.   Please update this list whenever your medications are discontinued, doses are changed, or new medications (including over-the-counter products) are added. Please carry medication information at all times in case of emergency situations. These are general instructions for a healthy lifestyle:    No smoking/ No tobacco products/ Avoid exposure to second hand smoke. Surgeon General's Warning:  Quitting smoking now greatly reduces serious risk to your health. Obesity, smoking, and a sedentary lifestyle greatly increase your risk for illness. A healthy diet, regular physical exercise & weight monitoring are important for maintaining a healthy lifestyle  You may be retaining fluid if you have a history of heart failure or if you experience any of the following symptoms:  Weight gain of 3 pounds or more overnight or 5 pounds in a week, increased swelling in our hands or feet or shortness of breath while lying flat in bed. Please call your doctor as soon as you notice any of these symptoms; do not wait until your next office visit. Recognize signs and symptoms of STROKE:  F  -  Face looks uneven  A  -  Arms unable to move or move unevenly  S  -  Speech slurred or non-existent  T  -  Time to call 911 - as soon as signs and symptoms begin - DO NOT go back to bed or wait to see If you get better - TIME IS BRAIN. Colorectal Screening  Colorectal cancer almost always develops from precancerous polyps (abnormal growths) in the colon or rectum. Screening tests can find precancerous polyps, so that they can be removed before they turn into cancer. Screening tests can also find colorectal cancer early, when treatment works best.  Speak with your physician about when you should begin screening and how often you should be tested. [facility] Activation    Thank you for requesting access to [facility]. Please follow the instructions below to securely access and download your online medical record.  [facility] allows you to send messages to your doctor, view your test results, renew your prescriptions, schedule appointments, and more. How Do I Sign Up? In your internet browser, go to [email or web address]. Click on the First Time User? Click Here link in the Sign In box. You will see the New Member Sign Up page. Enter your [employer] Access Code exactly as it appears below. You will not need to use this code after youve completed the sign-up process. If you do not sign up before the expiration date, you must request a new code. MyChart Access Code: Activation code not generated  Current [employer] Status: Active (This is the date your MyChart access code will )    Enter the last four digits of your Social Security Number (xxxx) and Date of Birth (mm/dd/yyyy) as indicated and click Submit. You will be taken to the next sign-up page. Create a You[employer]id[employer]ot ID. This will be your You[employer] login ID and cannot be changed, so think of one that is secure and easy to remember. Create a [employer] password. You can change your password at any time. Enter your Password Reset Question and Answer. This can be used at a later time if you forget your password. Enter your e-mail address. You will receive e-mail notification when new information is available in [address] Click Sign Up. You can now view and download portions of your medical record. Click the Wedit link to download a portable copy of your medical information. Additional Information    If you have questions, please call [phone]. Remember, [employer] is NOT to be used for urgent needs. For medical emergencies, dial 911. Educational references and/or instructions provided during this visit included:    See Attached      APPOINTMENTS:    Per MD Instruction    Discharge information has been reviewed with the patient. The patient verbalized understanding.

## 2023-01-19 NOTE — ANESTHESIA PREPROCEDURE EVALUATION
Relevant Problems   RESPIRATORY SYSTEM   (+) COPD (chronic obstructive pulmonary disease) with emphysema (HCC)   (+) TIRADO (dyspnea on exertion)      CARDIOVASCULAR   (+) HTN (hypertension)   (+) Heart murmur   (+) MISTY (renal artery stenosis) (HCC)      ENDOCRINE   (+) Diabetes mellitus type 2, controlled (HCC)   (+) Type 2 diabetes mellitus with nephropathy (HCC)   (+) Type II or unspecified type diabetes mellitus without mention of complication, not stated as uncontrolled       Anesthetic History   No history of anesthetic complications            Review of Systems / Medical History  Patient summary reviewed and pertinent labs reviewed    Pulmonary    COPD               Neuro/Psych              Cardiovascular    Hypertension  Valvular problems/murmurs: aortic stenosis        CAD      Comments: Mild-mod AS   GI/Hepatic/Renal     GERD           Endo/Other    Diabetes: type 2    Arthritis and cancer     Other Findings   Comments: Renal cancer s/p nephrectomy           Physical Exam    Airway  Mallampati: II  TM Distance: 4 - 6 cm  Neck ROM: normal range of motion   Mouth opening: Normal     Cardiovascular  Regular rate and rhythm,  S1 and S2 normal,  no murmur, click, rub, or gallop             Dental  No notable dental hx       Pulmonary  Breath sounds clear to auscultation               Abdominal  GI exam deferred       Other Findings            Anesthetic Plan    ASA: 3  Anesthesia type: MAC          Induction: Intravenous  Anesthetic plan and risks discussed with: Patient

## 2023-01-19 NOTE — ANESTHESIA POSTPROCEDURE EVALUATION
Procedure(s):  UPPER ENDOSCOPY with Bx's.     MAC    Anesthesia Post Evaluation      Multimodal analgesia: multimodal analgesia used between 6 hours prior to anesthesia start to PACU discharge  Patient location during evaluation: PACU  Patient participation: complete - patient participated  Level of consciousness: awake and alert  Pain management: adequate  Airway patency: patent  Anesthetic complications: no  Cardiovascular status: acceptable  Respiratory status: acceptable  Hydration status: acceptable  Post anesthesia nausea and vomiting:  controlled  Final Post Anesthesia Temperature Assessment:  Normothermia (36.0-37.5 degrees C)      INITIAL Post-op Vital signs:   Vitals Value Taken Time   /52 01/19/23 1053   Temp 36.6 °C (97.8 °F) 01/19/23 1043   Pulse 64 01/19/23 1053   Resp 16 01/19/23 1053   SpO2 95 % 01/19/23 1053

## (undated) DEVICE — SYRINGE MED 25GA 3ML L5/8IN SUBQ PLAS W/ DETACH NDL SFTY

## (undated) DEVICE — STERILE POLYISOPRENE POWDER-FREE SURGICAL GLOVES: Brand: PROTEXIS

## (undated) DEVICE — YANKAUER,SMOOTH HANDLE,HIGH CAPACITY: Brand: MEDLINE INDUSTRIES, INC.

## (undated) DEVICE — SOLUTION IRRIG 1000ML H2O STRL BLT

## (undated) DEVICE — CATHETER SUCT TR FL TIP 14FR W/ O CTRL

## (undated) DEVICE — BITE BLOCK ENDOSCP UNIV AD 6 TO 9.4 MM

## (undated) DEVICE — BASIN EMSIS 16OZ GRAPHITE PLAS KID SHP MOLD GRAD FOR ORAL

## (undated) DEVICE — MEDI-VAC NON-CONDUCTIVE SUCTION TUBING: Brand: CARDINAL HEALTH

## (undated) DEVICE — ENDOSCOPY PUMP TUBING/ CAP SET: Brand: ERBE

## (undated) DEVICE — LINER SUCT CANSTR 3000CC PLAS SFT PRE ASSEMB W/OUT TBNG W/

## (undated) DEVICE — CANNULA NSL AD TBNG L14FT STD PVC O2 CRV CONN NONFLARED NSL

## (undated) DEVICE — FLUFF AND POLYMER UNDERPAD,EXTRA HEAVY: Brand: WINGS

## (undated) DEVICE — SYRINGE MED 50ML LUERSLIP TIP

## (undated) DEVICE — GAUZE,SPONGE,4"X4",16PLY,STRL,LF,10/TRAY: Brand: MEDLINE